# Patient Record
Sex: MALE | Race: WHITE | NOT HISPANIC OR LATINO | Employment: FULL TIME | URBAN - METROPOLITAN AREA
[De-identification: names, ages, dates, MRNs, and addresses within clinical notes are randomized per-mention and may not be internally consistent; named-entity substitution may affect disease eponyms.]

---

## 2017-11-13 ENCOUNTER — OFFICE VISIT - HEALTHEAST (OUTPATIENT)
Dept: FAMILY MEDICINE | Facility: CLINIC | Age: 37
End: 2017-11-13

## 2017-11-13 DIAGNOSIS — Z30.09 ENCOUNTER FOR VASECTOMY COUNSELING: ICD-10-CM

## 2017-11-13 DIAGNOSIS — M67.40 GANGLION CYST: ICD-10-CM

## 2017-11-13 DIAGNOSIS — E78.00 PURE HYPERCHOLESTEROLEMIA: ICD-10-CM

## 2017-11-13 DIAGNOSIS — Z00.00 ROUTINE GENERAL MEDICAL EXAMINATION AT A HEALTH CARE FACILITY: ICD-10-CM

## 2017-11-13 LAB
CHOLEST SERPL-MCNC: 145 MG/DL
FASTING STATUS PATIENT QL REPORTED: YES
HDLC SERPL-MCNC: 41 MG/DL
LDLC SERPL CALC-MCNC: 96 MG/DL
TRIGL SERPL-MCNC: 39 MG/DL

## 2017-11-13 ASSESSMENT — MIFFLIN-ST. JEOR: SCORE: 1915.3

## 2017-11-16 ENCOUNTER — OFFICE VISIT - HEALTHEAST (OUTPATIENT)
Dept: FAMILY MEDICINE | Facility: CLINIC | Age: 37
End: 2017-11-16

## 2017-11-16 DIAGNOSIS — Z30.09 ENCOUNTER FOR VASECTOMY COUNSELING: ICD-10-CM

## 2017-11-21 ENCOUNTER — RECORDS - HEALTHEAST (OUTPATIENT)
Dept: ADMINISTRATIVE | Facility: OTHER | Age: 37
End: 2017-11-21

## 2017-11-30 ENCOUNTER — COMMUNICATION - HEALTHEAST (OUTPATIENT)
Dept: FAMILY MEDICINE | Facility: CLINIC | Age: 37
End: 2017-11-30

## 2018-02-17 ENCOUNTER — RECORDS - HEALTHEAST (OUTPATIENT)
Dept: ADMINISTRATIVE | Facility: OTHER | Age: 38
End: 2018-02-17

## 2019-05-28 ENCOUNTER — COMMUNICATION - HEALTHEAST (OUTPATIENT)
Dept: SCHEDULING | Facility: CLINIC | Age: 39
End: 2019-05-28

## 2019-05-28 ENCOUNTER — OFFICE VISIT - HEALTHEAST (OUTPATIENT)
Dept: FAMILY MEDICINE | Facility: CLINIC | Age: 39
End: 2019-05-28

## 2019-05-28 DIAGNOSIS — T14.8XXA PUNCTURE WOUND: ICD-10-CM

## 2019-11-14 ENCOUNTER — COMMUNICATION - HEALTHEAST (OUTPATIENT)
Dept: FAMILY MEDICINE | Facility: CLINIC | Age: 39
End: 2019-11-14

## 2019-11-26 ENCOUNTER — OFFICE VISIT - HEALTHEAST (OUTPATIENT)
Dept: FAMILY MEDICINE | Facility: CLINIC | Age: 39
End: 2019-11-26

## 2019-11-26 DIAGNOSIS — Z13.220 LIPID SCREENING: ICD-10-CM

## 2019-11-26 DIAGNOSIS — Z28.21 INFLUENZA VACCINATION DECLINED: ICD-10-CM

## 2019-11-26 DIAGNOSIS — R10.13 ABDOMINAL PAIN, EPIGASTRIC: ICD-10-CM

## 2019-11-26 LAB
ALBUMIN SERPL-MCNC: 4 G/DL (ref 3.5–5)
ALP SERPL-CCNC: 93 U/L (ref 45–120)
ALT SERPL W P-5'-P-CCNC: 24 U/L (ref 0–45)
ANION GAP SERPL CALCULATED.3IONS-SCNC: 9 MMOL/L (ref 5–18)
AST SERPL W P-5'-P-CCNC: 19 U/L (ref 0–40)
BILIRUB SERPL-MCNC: 0.8 MG/DL (ref 0–1)
BUN SERPL-MCNC: 13 MG/DL (ref 8–22)
CALCIUM SERPL-MCNC: 9.1 MG/DL (ref 8.5–10.5)
CHLORIDE BLD-SCNC: 105 MMOL/L (ref 98–107)
CHOLEST SERPL-MCNC: 161 MG/DL
CO2 SERPL-SCNC: 26 MMOL/L (ref 22–31)
CREAT SERPL-MCNC: 1.22 MG/DL (ref 0.7–1.3)
ERYTHROCYTE [DISTWIDTH] IN BLOOD BY AUTOMATED COUNT: 11.4 % (ref 11–14.5)
FASTING STATUS PATIENT QL REPORTED: YES
GFR SERPL CREATININE-BSD FRML MDRD: >60 ML/MIN/1.73M2
GLUCOSE BLD-MCNC: 96 MG/DL (ref 70–125)
HCT VFR BLD AUTO: 46.6 % (ref 40–54)
HDLC SERPL-MCNC: 44 MG/DL
HGB BLD-MCNC: 15.9 G/DL (ref 14–18)
LDLC SERPL CALC-MCNC: 104 MG/DL
LIPASE SERPL-CCNC: 16 U/L (ref 0–52)
MCH RBC QN AUTO: 30.8 PG (ref 27–34)
MCHC RBC AUTO-ENTMCNC: 34 G/DL (ref 32–36)
MCV RBC AUTO: 91 FL (ref 80–100)
PLATELET # BLD AUTO: 194 THOU/UL (ref 140–440)
PMV BLD AUTO: 8.1 FL (ref 7–10)
POTASSIUM BLD-SCNC: 4.1 MMOL/L (ref 3.5–5)
PROT SERPL-MCNC: 7.2 G/DL (ref 6–8)
RBC # BLD AUTO: 5.15 MILL/UL (ref 4.4–6.2)
SODIUM SERPL-SCNC: 140 MMOL/L (ref 136–145)
TRIGL SERPL-MCNC: 66 MG/DL
WBC: 6.5 THOU/UL (ref 4–11)

## 2019-11-26 ASSESSMENT — MIFFLIN-ST. JEOR: SCORE: 1944.21

## 2019-12-09 ENCOUNTER — OFFICE VISIT - HEALTHEAST (OUTPATIENT)
Dept: FAMILY MEDICINE | Facility: CLINIC | Age: 39
End: 2019-12-09

## 2019-12-09 DIAGNOSIS — R10.13 ABDOMINAL PAIN, EPIGASTRIC: ICD-10-CM

## 2019-12-09 DIAGNOSIS — Z00.00 ROUTINE GENERAL MEDICAL EXAMINATION AT A HEALTH CARE FACILITY: ICD-10-CM

## 2019-12-09 ASSESSMENT — MIFFLIN-ST. JEOR: SCORE: 1948.97

## 2020-01-29 ENCOUNTER — COMMUNICATION - HEALTHEAST (OUTPATIENT)
Dept: FAMILY MEDICINE | Facility: CLINIC | Age: 40
End: 2020-01-29

## 2020-01-29 DIAGNOSIS — R10.13 ABDOMINAL PAIN, EPIGASTRIC: ICD-10-CM

## 2020-06-09 ENCOUNTER — COMMUNICATION - HEALTHEAST (OUTPATIENT)
Dept: FAMILY MEDICINE | Facility: CLINIC | Age: 40
End: 2020-06-09

## 2020-06-09 DIAGNOSIS — Z13.9 SCREENING FOR CONDITION: ICD-10-CM

## 2020-06-19 ENCOUNTER — COMMUNICATION - HEALTHEAST (OUTPATIENT)
Dept: FAMILY MEDICINE | Facility: CLINIC | Age: 40
End: 2020-06-19

## 2020-12-09 ENCOUNTER — AMBULATORY - HEALTHEAST (OUTPATIENT)
Dept: FAMILY MEDICINE | Facility: CLINIC | Age: 40
End: 2020-12-09

## 2020-12-09 DIAGNOSIS — Z30.8 SURVEILLANCE FOR BIRTH CONTROL, VASECTOMY: ICD-10-CM

## 2020-12-10 ENCOUNTER — COMMUNICATION - HEALTHEAST (OUTPATIENT)
Dept: FAMILY MEDICINE | Facility: CLINIC | Age: 40
End: 2020-12-10

## 2020-12-10 DIAGNOSIS — L60.8 CHANGE IN NAIL APPEARANCE: ICD-10-CM

## 2020-12-14 ENCOUNTER — OFFICE VISIT - HEALTHEAST (OUTPATIENT)
Dept: PODIATRY | Facility: CLINIC | Age: 40
End: 2020-12-14

## 2020-12-14 DIAGNOSIS — B35.1 NAIL FUNGUS: ICD-10-CM

## 2021-05-26 VITALS — HEART RATE: 84 BPM | RESPIRATION RATE: 16 BRPM | SYSTOLIC BLOOD PRESSURE: 122 MMHG | DIASTOLIC BLOOD PRESSURE: 70 MMHG

## 2021-05-29 NOTE — TELEPHONE ENCOUNTER
Pt wife calling  Stepped on a nail over the week end on Saturday and is painful, pt goes to the [facility]  No redness, warmth or drainage noted    Recommend appt eval today for td and assessment    Gerda Dunlap RN [facility] RN Triage      Reason for Disposition    [1] Last tetanus shot > 5 years ago AND [2] DIRTY cut or scrape    Protocols used: FOOT AND ANKLE INJURY-A-AH

## 2021-05-29 NOTE — PROGRESS NOTES
Assessment/Plan:    Erwin Soriano is a 38 y.o. male presenting for:    1. Puncture wound  This appears clean and noninfected today.  Given that this is 3 days out I think it is unlikely that this will become infected but we did discuss signs and symptoms in detail and he will let me know if any of this occurs.  Otherwise, we will get him up-to-date with his tetanus vaccination.  Is the nail was new and I do not necessarily have any overt concerns for tetanus.  - Tdap vaccine,  6yo or older,  IM        There are no discontinued medications.        Chief Complaint:  Chief Complaint   Patient presents with     Wound     Stepped on nail on Saturday with left foot.  Last Tdap 1/14/2011       Subjective:   Erwin Soriano is a pleasant 38-year-old gentleman presenting to the clinic today with concerns after stepping on a nail this weekend.  Patient was wearing shoes.  He states that the nails were new.  He is unsure of how far the nail went into his foot but was able to remove it without any difficulty.  He estimates a few millimeters potentially.  He is kept the area clean.  There was initially some bleeding but not very much.    The area was slightly sore immediately after the injury.  He has been able to move his toe without any difficulty.  Soreness has been fading.    His last tetanus shot was in 2011.    12 point review of systems completed and negative except for what has been described above    Social History     Tobacco Use   Smoking Status Former Smoker   Smokeless Tobacco Current User     Types: Chew       No current outpatient medications on file.         Objective:  Vitals:    05/28/19 1010   BP: 118/74   Pulse: 76   Weight: (!) 225 lb 9.6 oz (102.3 kg)       Body mass index is 32.84 kg/m .    Vital signs reviewed and stable  General: No acute distress  Psych: Appropriate affect  HEENT: moist mucous membranes  Extremities: warm and well perfused with no edema  Skin: warm and dry with no rash, very small closed  puncture wound just distal to the first MTP on the left pad of his toe.  No tenderness to palpation.         This note has been dictated and transcribed using voice recognition software.   Any errors in transcription are unintentional and inherent to the software.

## 2021-05-31 VITALS — WEIGHT: 220 LBS | BODY MASS INDEX: 32.02 KG/M2

## 2021-05-31 VITALS — WEIGHT: 222 LBS | HEIGHT: 70 IN | BODY MASS INDEX: 31.78 KG/M2

## 2021-06-01 ENCOUNTER — RECORDS - HEALTHEAST (OUTPATIENT)
Dept: ADMINISTRATIVE | Facility: CLINIC | Age: 41
End: 2021-06-01

## 2021-06-02 ENCOUNTER — OFFICE VISIT - HEALTHEAST (OUTPATIENT)
Dept: FAMILY MEDICINE | Facility: CLINIC | Age: 41
End: 2021-06-02

## 2021-06-02 VITALS — WEIGHT: 225.6 LBS | BODY MASS INDEX: 32.84 KG/M2

## 2021-06-02 DIAGNOSIS — K21.9 GASTROESOPHAGEAL REFLUX DISEASE WITHOUT ESOPHAGITIS: ICD-10-CM

## 2021-06-02 DIAGNOSIS — Z30.2 ENCOUNTER FOR VASECTOMY: ICD-10-CM

## 2021-06-02 DIAGNOSIS — Z83.719 FAMILY HISTORY OF COLONIC POLYPS: ICD-10-CM

## 2021-06-02 DIAGNOSIS — Z00.00 ROUTINE GENERAL MEDICAL EXAMINATION AT A HEALTH CARE FACILITY: ICD-10-CM

## 2021-06-02 LAB
ANION GAP SERPL CALCULATED.3IONS-SCNC: 10 MMOL/L (ref 5–18)
BUN SERPL-MCNC: 16 MG/DL (ref 8–22)
CALCIUM SERPL-MCNC: 8.8 MG/DL (ref 8.5–10.5)
CHLORIDE BLD-SCNC: 107 MMOL/L (ref 98–107)
CHOLEST SERPL-MCNC: 161 MG/DL
CO2 SERPL-SCNC: 25 MMOL/L (ref 22–31)
CREAT SERPL-MCNC: 1.09 MG/DL (ref 0.7–1.3)
ERYTHROCYTE [DISTWIDTH] IN BLOOD BY AUTOMATED COUNT: 13.2 % (ref 11–14.5)
FASTING STATUS PATIENT QL REPORTED: YES
GFR SERPL CREATININE-BSD FRML MDRD: >60 ML/MIN/1.73M2
GLUCOSE BLD-MCNC: 102 MG/DL (ref 70–125)
HCT VFR BLD AUTO: 45.3 % (ref 40–54)
HDLC SERPL-MCNC: 44 MG/DL
HGB BLD-MCNC: 15.2 G/DL (ref 14–18)
LDLC SERPL CALC-MCNC: 108 MG/DL
MCH RBC QN AUTO: 29.7 PG (ref 27–34)
MCHC RBC AUTO-ENTMCNC: 33.6 G/DL (ref 32–36)
MCV RBC AUTO: 89 FL (ref 80–100)
PLATELET # BLD AUTO: 185 THOU/UL (ref 140–440)
PMV BLD AUTO: 9.9 FL (ref 7–10)
POTASSIUM BLD-SCNC: 4.1 MMOL/L (ref 3.5–5)
RBC # BLD AUTO: 5.12 MILL/UL (ref 4.4–6.2)
SODIUM SERPL-SCNC: 142 MMOL/L (ref 136–145)
TRIGL SERPL-MCNC: 47 MG/DL
WBC: 6.7 THOU/UL (ref 4–11)

## 2021-06-02 RX ORDER — FAMOTIDINE 40 MG/1
40 TABLET, FILM COATED ORAL EVERY EVENING
Qty: 90 TABLET | Refills: 3 | Status: SHIPPED | OUTPATIENT
Start: 2021-06-02 | End: 2022-03-30

## 2021-06-02 ASSESSMENT — MIFFLIN-ST. JEOR: SCORE: 1968.94

## 2021-06-03 VITALS
WEIGHT: 230.3 LBS | SYSTOLIC BLOOD PRESSURE: 126 MMHG | BODY MASS INDEX: 34.11 KG/M2 | RESPIRATION RATE: 12 BRPM | HEART RATE: 68 BPM | HEIGHT: 69 IN | DIASTOLIC BLOOD PRESSURE: 84 MMHG

## 2021-06-03 VITALS
RESPIRATION RATE: 20 BRPM | WEIGHT: 228.38 LBS | TEMPERATURE: 97.9 F | BODY MASS INDEX: 32.69 KG/M2 | DIASTOLIC BLOOD PRESSURE: 80 MMHG | HEIGHT: 70 IN | SYSTOLIC BLOOD PRESSURE: 120 MMHG | HEART RATE: 84 BPM

## 2021-06-03 NOTE — PROGRESS NOTES
Normal CBC, CMP, lipid Cascade, and lipase.  Patient updated by my chart message.  Sharita Montoya, DO

## 2021-06-03 NOTE — PROGRESS NOTES
[facility] Note    Name: Erwin Soriano  : 1980   MRN: [id number]    Erwin Soriano is a 39 y.o. male presenting to discuss the following:     CC:   Chief Complaint   Patient presents with     Abdominal Pain       HPI:  Epigastric abdominal pain, painful throughout the day, worse when sitting down at night with more pressure in the area. Symptoms have been present for a month or so now. Symptoms are stable, not worsening. Doesn't correlate with certain foods. Didn't identify a trigger. Worsens at the end of the day, may be paying more attention? Pain doesn't radiate anywhere.    Tried Prilosec pack (approximately 2 weeks), helped a little bit.     ROS:   CONSTITUTIONAL: No fevers or chills, no weight changes, no night sweats.   HEART: Feels more short of breath with activity. No tachycardia, no lightheadedness, no dizziness, no presyncope.   LUNGS: Coughing more at night. History of smoking, has quit.   ABDOMEN: No nausea, no vomiting, no diarrhea. History of acid reflux. No constipation, no blood in stools.   : No urinary symptoms.    PMH:   Patient Active Problem List   Diagnosis     Hypercholesterolemia     Overweight       No past medical history on file.    PSH:   No past surgical history on file.      MEDICATIONS:   No current outpatient medications on file prior to visit.     No current facility-administered medications on file prior to visit.        ALLERGIES:  No Known Allergies    FAMHx:  Family History   Problem Relation Age of Onset     COPD Father      No Medical Problems Sister      No Medical Problems Brother      Cancer Paternal Grandfather    No family history of heart disease.    SOCIAL HISTORY:   Social History     Tobacco Use     Smoking status: Former Smoker     Smokeless tobacco: Former User     Types: Chew   Substance Use Topics     Alcohol use: Not on file     Drug use: Not on file       PHYSICAL EXAM:   /80   Pulse 84   Temp 97.9  F (36.6  C) (Oral)   Resp 20    "Ht 5' 9.5\" (1.765 m)   Wt (!) 228 lb 6 oz (103.6 kg)   BMI 33.24 kg/m     GENERAL: Erwin is a pleasant, well appearing male, obese, in no acute distress.   HEENT: Sclera white, no nasal discharge.   HEART: Regular rate and rhythm, no murmurs.   LUNGS: Clear to auscultation bilaterally, unlabored.   ABDOMEN: Soft.  Tender to palpation in epigastric region, no guarding, no rigidity, no rebound tenderness.  No palpable masses.    ASSESSMENT & PLAN:   Erwin Soriano is a 39 y.o. male presenting today for evaluation of 4-6 weeks of epigastric abdominal pain, slightly improved with 2 week trial of Prilosec.     1. Abdominal pain, epigastric  - HM2(CBC w/o Differential)  - Comprehensive Metabolic Panel  - Lipase  - omeprazole (PRILOSEC) 20 MG capsule; Take 1 capsule (20 mg total) by mouth daily.  Dispense: 60 capsule; Refill: 0    Epigastric abdominal symptoms, concerning for underlying gastritis.  Discussed differential diagnosis of angina versus gallbladder disease versus liver disease versus pancreatitis versus constipation.  No red flag symptoms present.  No obvious triggers.  Do not suspect underlying cardiac etiology given symptoms have been persistent for the last 4 to 6 weeks, no exertional chest pain, and low risk patient. Symptoms slightly improved with trial of PPI.  Discussed option of 8-week treatment with omeprazole to treat underlying stomach acid problem.  If symptoms worsen despite omeprazole, or recur after discontinuation of omeprazole, recommend proceeding to EGD.  Discussed option of labs to help rule out other underlying etiologies and patient is interested in laboratory today.  CBC, CMP, and lipase obtained.  We will be in touch by my chart with lab results.    2. Lipid screening  - Lipid Cascade FASTING    3. Influenza vaccination declined    HM: Declines flu shot today     RTC: 2 months - follow up epigastric abdominal pain / sooner if worsening  1 year - annual physical exam    Sharita Montoya, DO   "

## 2021-06-04 NOTE — PATIENT INSTRUCTIONS - HE
Continue omeprazole for the next 2-3 weeks then you can wean that.   Try to limit spicy foods, toamto based products and ibuprofen  If having ongoing pain I will recommend an upper endoscopy  Limit carbohydrates and work on weight loss  You can consider a colonoscopy next year

## 2021-06-04 NOTE — PROGRESS NOTES
Assessment/ Plan     1. Routine general medical examination at a health care facility    Recommend he continue work on diet and weight loss  Patient declines the influenza vaccine today    Given a family history of colon cancer in his grandmother and colon polyps in his father you have a colonoscopy by age 40    His recent cholesterol numbers were excellent  His blood sugar test was normal    2. Abdominal pain, epigastric  May be secondary to dyspepsia.  He has a history of esophageal reflux symptoms    He has shown improvement while taking omeprazole  Recommend limiting spicy foods, tomato based products, and NSAID intake  His kidney and liver profile were within normal limits  Discussed that if he has ongoing discomfort would consider an abdominal ultrasound to rule out gallbladder etiology.  Consider also an upper endoscopy  Patient will continue Meprazole over the next 2-3 weeks and then consider weaning that medication  He will keep a diary and follow-up as needed      Subjective:       Erwin Soriano is a 39 y.o. male who presents to the clinic for complete physical examination.  Recent medical history is notable for an evaluation for epigastric abdominal pain which he has had over the past 6 weeks or so.  He describes a constant, aching discomfort.  This was worse at night.  He was started on omeprazole and there has been some improvement.  In the past he has had reflux symptoms.  His comprehensive metabolic panel revealed normal kidney and liver function.  He denies dark tarry stools or passage of bright red blood per rectum.    Since his last physical examination he has followed up with orthopedics for treatment of a flexor tendon sheath of the left middle finger.  That has resolved.    He states that he can get mildly short of breath with exertion but admits he is not getting regular aerobic exercise.  He is overweight.    Family history is reviewed.  His grandmother had colon cancer and his father has had  "colon polyps.  He agrees to consider a colonoscopy this next year.    His recent total cholesterol is 161 with an LDL of 104.    Review of systems otherwise negative.  In the past he contemplated vasectomy but now is planning on having one more child.    He denies chest pain with exertion, depression symptoms, or mood concerns.  He has been sober for 14 years.  He previously drank alcohol.    The following portions of the patient's history were reviewed and updated as appropriate: allergies, current medications, past family history, past medical history, past social history, past surgical history and problem list. Medications have been reconciled    Review of Systems   A 12 point comprehensive review of systems was negative except as noted.      Current Outpatient Medications   Medication Sig Dispense Refill     omeprazole (PRILOSEC) 20 MG capsule Take 1 capsule (20 mg total) by mouth daily. 60 capsule 0     No current facility-administered medications for this visit.        Objective:      /84   Pulse 68   Resp 12   Ht 5' 9.25\" (1.759 m)   Wt (!) 230 lb 4.8 oz (104.5 kg)   BMI 33.76 kg/m        General appearance: alert, appears stated age and cooperative  Head: Normocephalic, without obvious abnormality, atraumatic  Eyes: conjunctivae/corneas clear. PERRL, EOM's intact.   Ears: normal TM's and external ear canals both ears  Nose: Nares normal. Septum midline. Mucosa normal. No drainage or sinus tenderness.  Throat: lips, mucosa, and tongue normal; teeth and gums normal  Neck: no adenopathy, supple, symmetrical, trachea midline and thyroid not enlarged  Back: symmetric, no curvature. ROM normal. No CVA tenderness.  Lungs: clear to auscultation bilaterally  Heart: regular rate and rhythm, S1, S2 normal, no murmur, click, rub or gallop  Abdomen: soft, non-tender; bowel sounds normal; no masses,  no organomegaly  Genitourinary: Penis circumcised, there are no scrotal masses, no inguinal hernia  Extremities: " extremities normal, atraumatic, no cyanosis or edema  Skin: Skin color, texture, turgor normal.  There is a large tattoo on the right side of his abdomen and upper back  Lymph nodes: Cervical nodes normal.  Neurologic: Alert and oriented X 3         No results found for this or any previous visit (from the past 168 hour(s)).       This note has been dictated using voice recognition software. Any grammatical or context distortions are unintentional and inherent to the software

## 2021-06-05 NOTE — TELEPHONE ENCOUNTER
Refill Approved    Rx renewed per Medication Renewal Policy. Medication was last renewed on 11/26/19.    Denise Frank, [facility] Triage/Med Refill 1/29/2020     Requested Prescriptions   Pending Prescriptions Disp Refills     omeprazole (PRILOSEC) 20 MG capsule [Pharmacy Med Name: OMEPRAZOLE DR 20 MG CAPSULE] 60 capsule 0     Sig: TAKE 1 CAPSULE BY MOUTH EVERY DAY       GI Medications Refill Protocol Passed - 1/29/2020 10:40 AM        Passed - PCP or prescribing provider visit in last 12 or next 3 months.     Last office visit with prescriber/PCP: 11/26/2019 Sharita Montoya DO OR same dept: 11/26/2019 Sharita Montoya DO OR same specialty: 11/26/2019 Sharita Montoya DO  Last physical: Visit date not found Last MTM visit: Visit date not found   Next visit within 3 mo: Visit date not found  Next physical within 3 mo: Visit date not found  Prescriber OR PCP: Sharita Montoya DO  Last diagnosis associated with med order: 1. Abdominal pain, epigastric  - omeprazole (PRILOSEC) 20 MG capsule [Pharmacy Med Name: OMEPRAZOLE DR 20 MG CAPSULE]; TAKE 1 CAPSULE BY MOUTH EVERY DAY  Dispense: 60 capsule; Refill: 0    If protocol passes may refill for 12 months if within 3 months of last provider visit (or a total of 15 months).

## 2021-06-08 NOTE — TELEPHONE ENCOUNTER
Dr. Hester-  Is pt able to get labs done at this time to check his blood type? Please place order if appropriate.

## 2021-06-13 NOTE — TELEPHONE ENCOUNTER
Referral pended. Looks like you havent seen him since last Dec. Do you need to see him for a visit?

## 2021-06-13 NOTE — PROGRESS NOTES
FOOT AND ANKLE SURGERY/PODIATRY CONSULT NOTE         ASSESSMENT:   Onychomycosis third and fourth toe left foot      TREATMENT:  I have recommended ciclopirox to be applied as directed.  I informed the patient that this medication may or may not be effective in treating his fungal infection.  He was told that he would have to use this medication for several months.  He is to return to the clinic as needed.        HPI: I was asked to see Erwin Soriano today to evaluate and treat discoloration of the toenails on the left foot.  The patient stated that over the past year he noticed that his third and fourth toenails began to change color.  They are also becoming a bit thick and slightly brittle.  He has not had any trauma to the toenails.  He has not had any redness or swelling surrounding this particular nails.  He denies any other previous treatment.  He is able to wear shoes without any discomfort.  The patient was seen in consultation at the request of Antoine Ford MD for evaluation and treatment of fungal toenails left foot.     No past medical history on file.    No past surgical history on file.    Allergies   Allergen Reactions     Amoxicillin Rash     At childhood           Current Outpatient Medications:      omeprazole (PRILOSEC) 20 MG capsule, TAKE 1 CAPSULE BY MOUTH EVERY DAY, Disp: 90 capsule, Rfl: 3    Family History   Problem Relation Age of Onset     COPD Father      No Medical Problems Sister      No Medical Problems Brother      Colon cancer Paternal Grandmother      Cancer Paternal Grandfather        Social History     Socioeconomic History     Marital status:      Spouse name: Not on file     Number of children: Not on file     Years of education: Not on file     Highest education level: Not on file   Occupational History     Not on file   Social Needs     Financial resource strain: Not on file     Food insecurity     Worry: Not on file     Inability: Not on file     Transportation needs      Medical: Not on file     Non-medical: Not on file   Tobacco Use     Smoking status: Former Smoker     Smokeless tobacco: Former User     Types: Chew   Substance and Sexual Activity     Alcohol use: Not on file     Drug use: Not on file     Sexual activity: Not on file   Lifestyle     Physical activity     Days per week: Not on file     Minutes per session: Not on file     Stress: Not on file   Relationships     Social connections     Talks on phone: Not on file     Gets together: Not on file     Attends Rastafari service: Not on file     Active member of club or organization: Not on file     Attends meetings of clubs or organizations: Not on file     Relationship status: Not on file     Intimate partner violence     Fear of current or ex partner: Not on file     Emotionally abused: Not on file     Physically abused: Not on file     Forced sexual activity: Not on file   Other Topics Concern     Not on file   Social History Narrative     Not on file       Review of Systems - Patient denies fever, chills, rash, wound, stiffness, limping, numbness, weakness, heart burn, blood in stool, chest pain with activity, calf pain when walking, shortness of breath with activity, chronic cough, easy bleeding/bruising, swelling of ankles, excessive thirst, fatigue, depression, anxiety.  Patient admits to fungus toenails left foot.      OBJECTIVE:  Appearance: alert, well appearing, and in no distress.    Vitals:    12/14/20 1509   BP: 122/70   Pulse: 84   Resp: 16       BMI= There is no height or weight on file to calculate BMI.    General appearance: Patient is alert and fully cooperative with history & exam.  No sign of distress is noted during the visit.  Psychiatric: Affect is pleasant & appropriate.  Patient appears motivated to improve health.  Respiratory: Breathing is regular & unlabored while sitting.  HEENT: Hearing is intact to spoken word.  Speech is clear.  No gross evidence of visual impairment that would impact  ambulation.    Vascular: Dorsalis pedis and posterior tibial pulses are palpable. There is pedal hair growth bilaterally.  CFT < 3 sec from anterior tibial surface to distal digits bilaterally. There is no appreciable edema noted.  Dermatologic: The third and fourth toenails left foot are severely discolored.  The third toenail is slightly thickened.  Turgor and texture are within normal limits. No coloration or temperature changes. No primary or secondary lesions noted.  Neurologic: All epicritic and proprioceptive sensations are grossly intact bilaterally.  Musculoskeletal: All active and passive ankle, subtalar, midtarsal, and 1st MPJ range of motion are grossly intact without pain or crepitus, with the exception of none. Manual muscle strength is within normal limits bilaterally. All dorsiflexors, plantarflexors, invertors, evertors are intact bilaterally.  No tenderness present to the third and fourth toes left foot on palpation.  No tenderness to the third and fourth toes left foot with range of motion. Calf is soft/non-tender without warmth/induration    Imaging:         No results found.         Tu Wesley; LEVI  [facility] Foot & Ankle Surgery/Podiatry

## 2021-06-14 NOTE — PROGRESS NOTES
Assessment/ Plan     1. Routine general medical examination at a health care facility    The following high BMI interventions were performed this visit: encouragement to exercise and weight monitoring   Check a lipid cascade and glucose  - Glucose  Recommend regular self testicular examination    2. Hypercholesterolemia    Recommend he continue to work on diet and exercise  Check a lipid cascade  - Lipid Cascade    3. Encounter for vasectomy counseling    Refer to Dr.Andrew Moon for consultation about a vasectomy    4. Ganglion cyst, left third finger    Refer to orthopedics to discuss removal    - Ambulatory referral to Orthopedics      Subjective:       Erwin Soriano is a 37 y.o. male who presents for a complete physical examination.  His medical history is generally unremarkable for chronic health conditions.  He is not taking any medications currently and denies any allergies.  Social history is notable for the fact that he is  and has 3 children.  He is self-employed in own some automotive shops.    He has two primary concerns.  First, he has developed small lump involving the left third finger.  This can cause some discomfort at times.  Next, he would like to have a vasectomy.  In the past his total cholesterol was 149 with an LDL of 94.  Review of systems is negative for headache, dizziness, chest pain, shortness breath, palpitations, or bowel concerns.  He generally is doing well.    The following portions of the patient's history were reviewed and updated as appropriate: allergies, current medications, past family history, past medical history, past social history, past surgical history and problem list.    Review of Systems   A 12 point comprehensive review of systems was negative except as noted.      No current outpatient prescriptions on file.     No current facility-administered medications for this visit.        Objective:      /68  Pulse 72  Temp 98  F (36.7  C) (Oral)   Resp 20   "Ht 5' 9.5\" (1.765 m)  Wt 222 lb (100.7 kg)  BMI 32.31 kg/m2      General appearance: alert, appears stated age and cooperative  Head: Normocephalic, without obvious abnormality, atraumatic  Eyes: conjunctivae/corneas clear. PERRL, EOM's intact.   Ears: normal TM's and external ear canals both ears  Nose: Nares normal. Septum midline. Mucosa normal. No drainage or sinus tenderness.  Throat: lips, mucosa, and tongue normal; teeth and gums normal  Neck: no adenopathy, supple, symmetrical, trachea midline and thyroid not enlarged, symmetric, no tenderness/mass/nodules  Back: symmetric, no curvature. ROM normal. No CVA tenderness.  Lungs: clear to auscultation bilaterally  Heart: regular rate and rhythm, S1, S2 normal, no murmur, click, rub or gallop  Abdomen: soft, non-tender; bowel sounds normal; no masses,  no organomegaly  Genitourinary: Penis is circumcised, no scrotal masses, no inguinal hernia  Extremities: extremities normal, atraumatic, no cyanosis or edema  There is a small palpable lump involving the left third finger, palmar aspect  Skin: Skin color, texture, turgor normal. No rashes or lesions  Lymph nodes: Cervical nodes normal.  Neurologic: Alert and oriented X 3. Normal coordination and gait         Recent Results (from the past 168 hour(s))   Lipid Cascade   Result Value Ref Range    Cholesterol 145 <=199 mg/dL    Triglycerides 39 <=149 mg/dL    HDL Cholesterol 41 >=40 mg/dL    LDL Calculated 96 <=129 mg/dL    Patient Fasting > 8hrs? Yes    Glucose   Result Value Ref Range    Glucose 114 (H) 70 - 99 mg/dL    Patient Fasting > 8hrs? Yes           This note has been dictated using voice recognition software. Any grammatical or context distortions are unintentional and inherent to the software  "

## 2021-06-14 NOTE — PROGRESS NOTES
Assessment:    1. Encounter for vasectomy counseling           Plan:    Patient seen today for contraception counseling.  Desires permanent sterilization.  Pre-vasectomy literature was provided.  Risks benefits and alternatives were discussed.  Patient elects to continue with vasectomy with consent form reviewed and signed by patient.  Patient was scheduled for vasectomy at his convenience likely December 15, 2017.        Subjective:    Erwin Soriano is seen today for consultation regarding vasectomy.  Desires permanent sterilization.  Past medical social and family history reviewed and updated as noted below.  Denies history of inguinal hernia.  No history of epididymitis etc.  No chronic health concerns identified.  Comprehensive review of systems as above otherwise all negative.     - Diane x 2009  3 children (1 son with prior relationshiop, 2 daughters with current wife)  Surgeries: none  Hospitalizations: none  Mom -   Dad -   1 bro -   1 sis -   No smoke  EtOH: none (prior issues)  Owns automobile shops  Works with [employer]    History reviewed. No pertinent surgical history.     Family History   Problem Relation Age of Onset     COPD Father      No Medical Problems Sister      No Medical Problems Brother      Cancer Paternal Grandfather         History reviewed. No pertinent past medical history.     Social History   Substance Use Topics     Smoking status: Former Smoker     Smokeless tobacco: Current User     Types: Chew     Alcohol use None        No current outpatient prescriptions on file.     No current facility-administered medications for this visit.           Objective:    Vitals:    11/16/17 1430   BP: 110/60   Pulse: 72   Weight: 220 lb (99.8 kg)      Body mass index is 32.02 kg/(m^2).    Alert.  No apparent distress.  Chest clear.  Cardiac exam regular.  Genitalia circumcised male with testes descended bilaterally.  No hydrocele.  Left sided varicocele.  Palpable vas deferens.  No inguinal  hernia.  Extremities warm and dry.

## 2021-06-16 PROBLEM — K21.9 GASTROESOPHAGEAL REFLUX DISEASE WITHOUT ESOPHAGITIS: Status: ACTIVE | Noted: 2021-06-02

## 2021-06-26 ENCOUNTER — HEALTH MAINTENANCE LETTER (OUTPATIENT)
Age: 41
End: 2021-06-26

## 2021-06-26 NOTE — PATIENT INSTRUCTIONS - HE
Remain physically active.  Your goal is 30 minutes aerobic exercise most days  I sent famotidine/Pepcid 40 mg a day to your pharmacy  Please me know if that is not working.  We can consider sending medication that is stronger  Set up the colonoscopy  Follow-up with urology to discuss a vasectomy

## 2021-07-04 NOTE — PROGRESS NOTES
Progress Notes by Antoine Hester MD at 6/2/2021  7:00 AM     Author: Antoine Hester MD Service: -- Author Type: Physician    Filed: 6/17/2021  5:48 PM Encounter Date: 6/2/2021 Status: Signed    : Antoine Hester MD (Physician)       MALE PREVENTATIVE EXAM    Assessment and Plan:     Patient has been advised of split billing requirements and indicates understanding: Yes    1. Routine general medical examination at a health care facility    Recommend remaining physically active.  His goal is 30 minutes of aerobic exercise most days    His second Covid vaccine is due today    2. Gastroesophageal reflux disease without esophagitis    He did not tolerate omeprazole in the past  Recommend a trial of famotidine/Pepcid 40 mg daily  If not effective then consider a trial of Protonix or Prevacid  Discussed that if he has ongoing breakthrough symptoms can consider an upper endoscopy  - famotidine (PEPCID) 40 MG tablet; Take 1 tablet (40 mg total) by mouth every evening.  Dispense: 90 tablet; Refill: 3    3. Family history of colonic polyps    Refer for a colonoscopy given a family history of colon polyps in multiple relatives  - Ambulatory referral for Colonoscopy    4. Encounter for vasectomy    Refer to urology  - Ambulatory referral to Urology        Next follow up:  No follow-ups on file.    Immunization Review  Adult Imm Review: No immunizations due today  BMI: Reviewed plan  Documented tobacco use.  Website and phone contact for [employer] given to patient in AVS. and No tobacco use    I discussed the following with the patient:   Adult Healthy Living: Importance of regular exercise  Healthy nutrition    I have had an Advance Directives discussion with the patient.    Subjective:   Chief Complaint: Erwin Soriano is an 40 y.o. male here for a preventative health visit.    Patient has been advised of split billing requirements and indicates understanding: Yes  HPI: This is a pleasant  40-year-old male who presents to the clinic for complete physical examination.      His medical history is notable for history of reflux symptoms.  In the past he had an evaluation for abdominal pain and was started on omeprazole at the time.  There was significant improvement and he has not taken that on a consistent basis.  He did not tolerate omeprazole very well.  However, he does have intermittent reflux symptoms.    His last total cholesterol is 161 with an LDL of 104.    He notes that there is a family history of colon polyps and he would like to consider a colonoscopy as multiple relatives have been affected.    Additionally, he would like to follow-up with urology for a vasectomy.      .Healthy Habits  Are you taking a daily aspirin? No  Do you typically exercising at least 40 min, 3-4 times per week?  NO  Do you usually eat at least 4 servings of fruit and vegetables a day, include whole grains and fiber and avoid regularly eating high fat foods? NO  Have you had an eye exam in the past two years? NO  Do you see a dentist twice per year? Yes  Do you have any concerns regarding sleep? No    Safety Screen  If you own firearms, are they secured in a locked gun cabinet or with trigger locks? NO  Do you feel you are safe where you are living?: Yes (6/2/2021  7:17 AM)  Do you feel you are safe in your relationship(s)?: Yes (6/2/2021  7:17 AM)      Review of Systems:  Please see above.  The rest of the review of systems are negative for all systems.     Cancer Screening     Patient has no health maintenance due at this time          Patient Care Team:  Antoine Hester MD as PCP - General  Antoine Hester MD as Assigned PCP  Tu Wesley DPM as Assigned Musculoskeletal Provider        History     Reviewed By Date/Time Sections Reviewed    Alexandra Ya CMA 6/2/2021  7:15 AM Tobacco            Objective:   Vital Signs:   Visit Vitals  /81 (Patient Site: Left Arm, Patient Position:  "Sitting, Cuff Size: Adult Regular)   Pulse (!) 59   Temp (!) 96.5  F (35.8  C) (Oral)   Resp 16   Ht 5' 10.08\" (1.78 m)   Wt (!) 231 lb 12.8 oz (105.1 kg)   BMI 33.18 kg/m           PHYSICAL EXAM  General appearance: alert, appears stated age and cooperative  Head: Normocephalic, without obvious abnormality, atraumatic  Eyes: conjunctivae/corneas clear. PERRL, EOM's intact.   Ears: normal TM's and external ear canals both ears  Nose: Nares normal. Septum midline. Mucosa normal.  Throat: lips, mucosa, and tongue normal; teeth and gums normal  Neck: no adenopathy, supple, symmetrical, trachea midline   Back: symmetric, no curvature. ROM normal.  Lungs: clear to auscultation bilaterally  Heart: regular rate and rhythm, S1, S2 normal, no murmur, click, rub or gallop  Abdomen: soft, non-tender  Extremities: extremities normal, atraumatic, no cyanosis or edema  Skin: Skin color, texture, turgor normal.  Lymph nodes: Cervical nodes normal.  Neurologic: Alert and oriented X 3.        The 10-year ASCVD risk score (Jj DC Jr., et al., 2013) is: 0.7%    Values used to calculate the score:      Age: 40 years      Sex: Male      Is Non- : No      Diabetic: No      Tobacco smoker: No      Systolic Blood Pressure: 117 mmHg      Is BP treated: No      HDL Cholesterol: 44 mg/dL      Total Cholesterol: 161 mg/dL         Medication List          Accurate as of June 2, 2021 11:59 PM. If you have any questions, ask your nurse or doctor.            START taking these medications    famotidine 40 MG tablet  Also known as: PEPCID  INSTRUCTIONS: Take 1 tablet (40 mg total) by mouth every evening.  Started by: Antoine Hester MD           STOP taking these medications    omeprazole 20 MG capsule  Also known as: PriLOSEC  Stopped by: Antoine Hester MD           Where to Get Your Medications      These medications were sent to [facility]/pharmacy #[id number] - [place], [place] - [address] [facility] [address]  [address] [place] [address], [place] " [facility] [address]    Phone: [phone]     famotidine 40 MG tablet         Additional Screenings Completed Today:

## 2021-07-06 VITALS
HEIGHT: 70 IN | RESPIRATION RATE: 16 BRPM | SYSTOLIC BLOOD PRESSURE: 117 MMHG | HEART RATE: 59 BPM | BODY MASS INDEX: 33.18 KG/M2 | TEMPERATURE: 96.5 F | WEIGHT: 231.8 LBS | DIASTOLIC BLOOD PRESSURE: 81 MMHG

## 2021-08-17 ENCOUNTER — TRANSFERRED RECORDS (OUTPATIENT)
Dept: HEALTH INFORMATION MANAGEMENT | Facility: CLINIC | Age: 41
End: 2021-08-17

## 2021-09-10 ENCOUNTER — APPOINTMENT (OUTPATIENT)
Dept: FAMILY MEDICINE | Facility: CLINIC | Age: 41
End: 2021-09-10
Payer: COMMERCIAL

## 2021-09-10 ENCOUNTER — OFFICE VISIT (OUTPATIENT)
Dept: FAMILY MEDICINE | Facility: CLINIC | Age: 41
End: 2021-09-10
Payer: COMMERCIAL

## 2021-09-10 VITALS
BODY MASS INDEX: 33.93 KG/M2 | DIASTOLIC BLOOD PRESSURE: 60 MMHG | OXYGEN SATURATION: 97 % | WEIGHT: 237 LBS | SYSTOLIC BLOOD PRESSURE: 120 MMHG | HEART RATE: 96 BPM

## 2021-09-10 DIAGNOSIS — Z30.2 ENCOUNTER FOR VASECTOMY: Primary | ICD-10-CM

## 2021-09-10 PROCEDURE — 55250 REMOVAL OF SPERM DUCT(S): CPT | Performed by: FAMILY MEDICINE

## 2021-09-10 PROCEDURE — 88302 TISSUE EXAM BY PATHOLOGIST: CPT | Performed by: PATHOLOGY

## 2021-09-10 NOTE — PROGRESS NOTES
Vasectomy Procedure Note    Indications: 40 year old male desiring permanent sterilization    Pre-operative Diagnosis: Undesired fertility    Post-operative Diagnosis: Undesired fertility    Anesthesia: 1% lidocaine, 3 ml    Procedure Details:    Erwin Soriano  is seen today for scheduled vasectomy.  Patient desires permanent sterilization.  Consent form previously reviewed and signed by patient.   Consent form reviewed prior to procedure with physician statement signed.   Patient elects to continue with scheduled procedure.     - Diane x 2009   3 children (1 son (age 20) with prior relationshiop, 2 daughters (9 and 5) with current wife)   Surgeries:  none   Hospitalizations:  none   Mom -   Dad -   1 bro -   1 sis -   No smoke   EtOH:  none (prior issues)   Owns automobile shops    Erwin Soriano was prepped and draped in usual sterile fashion.  Right vas deferens isolated subcutaneously.  1% lidocaine injected superficially then to vas deferens.  15 blade incision performed.  Curved hemostat for blunt dissection.  Towel clip for vas deferens isolation.  Vas deferens sheath removed exposing normal-appearing vas deferens.  4-O chromic suture both proximal and distal segment of vas deferens with central portion excised for pathology.  Electrocautery of vas deferens ends performed.  Distal end then replacing into fascia with 5-0 chromic suture.  Good hemostasis noted.  Vas deferens then replacing into scrotum.  Single 4-0 chromic suture for skin incision closure.    Left vas deferens then isolated in a similar fashion.  1% lidocaine injected superficially and then to level of vas deferens and surrounding tissue.  15 blade incision performed.  Curved hemostat for blunt dissection.  Towel clip for vas deferens isolation.  Vas deferens sheath removed exposing normal-appearing vas deferens.  4-0 chromic suture both proximal and distal segment of vas deferens with central portion excised for pathology.   Writer called patient with no answer times once to set up an emg appointment with Dr Robyn Adrian per order. Electrocautery of vas deferens ends performed.  Distal and replaced in the fascia with 5-0 chromic suture.  Good hemostasis noted.  Vas deferens then replaced into scrotum.  Single 4-0 chromic for skin incision closure.  Triple antibiotic with 4 x 4 gauze pads placed at bilateral incision sites.      Specimen: vas segment, bilateral    Condition: Stable    Complications: none    Plan:  1. Continue contraception until negative sperm analysis. Semen count after 20-25 ejaculations and 12 weeks s/p vasectomy.  2. Warning signs of infection were reviewed.   3. Written home care instructions provided.  Backup contraception until confirmed sterility.  May resume normal bathing after 24 hours.  Avoid strenuous activity times one week.  Ibuprofen or Tylenol OTC for pain management.  Notify with increased swelling, bleeding, or drainage.  Postvasectomy semen analysis in 12 weeks after 20-25 ejaculations to confirm desired sterility.  Call the clinic if excessive pain, bleeding or swelling.    Awake

## 2021-09-14 LAB
PATH REPORT.COMMENTS IMP SPEC: NORMAL
PATH REPORT.COMMENTS IMP SPEC: NORMAL
PATH REPORT.FINAL DX SPEC: NORMAL
PATH REPORT.GROSS SPEC: NORMAL
PATH REPORT.MICROSCOPIC SPEC OTHER STN: NORMAL
PATH REPORT.RELEVANT HX SPEC: NORMAL
PHOTO IMAGE: NORMAL

## 2021-10-16 ENCOUNTER — HEALTH MAINTENANCE LETTER (OUTPATIENT)
Age: 41
End: 2021-10-16

## 2021-12-17 ENCOUNTER — LAB (OUTPATIENT)
Dept: LAB | Facility: CLINIC | Age: 41
End: 2021-12-17
Payer: COMMERCIAL

## 2021-12-17 DIAGNOSIS — Z30.2 ENCOUNTER FOR VASECTOMY: Primary | ICD-10-CM

## 2021-12-17 LAB — SEMEN ANALYSIS P VAS PNL: NORMAL

## 2021-12-17 PROCEDURE — 89321 SEMEN ANAL SPERM DETECTION: CPT

## 2022-03-30 ENCOUNTER — OFFICE VISIT (OUTPATIENT)
Dept: FAMILY MEDICINE | Facility: CLINIC | Age: 42
End: 2022-03-30
Payer: COMMERCIAL

## 2022-03-30 ENCOUNTER — HOSPITAL ENCOUNTER (OUTPATIENT)
Dept: ULTRASOUND IMAGING | Facility: HOSPITAL | Age: 42
Discharge: HOME | End: 2022-03-30
Attending: FAMILY MEDICINE | Admitting: FAMILY MEDICINE
Payer: COMMERCIAL

## 2022-03-30 VITALS
HEART RATE: 63 BPM | OXYGEN SATURATION: 96 % | HEIGHT: 70 IN | BODY MASS INDEX: 33.04 KG/M2 | DIASTOLIC BLOOD PRESSURE: 80 MMHG | SYSTOLIC BLOOD PRESSURE: 122 MMHG | WEIGHT: 230.8 LBS

## 2022-03-30 DIAGNOSIS — M79.671 RIGHT FOOT PAIN: ICD-10-CM

## 2022-03-30 DIAGNOSIS — M79.89 SWELLING OF CALF: ICD-10-CM

## 2022-03-30 LAB — URATE SERPL-MCNC: 6.5 MG/DL (ref 3–8)

## 2022-03-30 PROCEDURE — 84550 ASSAY OF BLOOD/URIC ACID: CPT | Performed by: FAMILY MEDICINE

## 2022-03-30 PROCEDURE — 93971 EXTREMITY STUDY: CPT | Mod: RT

## 2022-03-30 PROCEDURE — 36415 COLL VENOUS BLD VENIPUNCTURE: CPT | Performed by: FAMILY MEDICINE

## 2022-03-30 PROCEDURE — 99213 OFFICE O/P EST LOW 20 MIN: CPT | Performed by: FAMILY MEDICINE

## 2022-03-30 NOTE — LETTER
April 1, 2022      Erwin Soriano  [address] [place] [address]        Dear ,  We are writing to inform you of your test results.    [place]:  Your uric acid is in the upper range of normal, but is not excessively high.  If the knee pain continues to occur intermittently a trial of low dose allopurinol could be considered to lower the uric acid a bit further.    If you would like to follow up with Podiatry for your foot pain just respond with a request on Exepron and I will place a referral.    Resulted Orders   Uric acid   Result Value Ref Range    Uric Acid 6.5 3.0 - 8.0 mg/dL       If you have any questions or concerns, please call the clinic at the number listed above.       Sincerely,      Chavo Velasquez MD

## 2022-03-30 NOTE — PATIENT INSTRUCTIONS
Hold and call right calf U/S to rule out DVT    Check uric acid to assess gout risk.    If ultrasound and uric acid are normal then I would consult with Podiatry.

## 2022-03-30 NOTE — PROGRESS NOTES
"DIAGNOSIS:  Encounter Diagnoses   Name Primary?     Right foot pain      Swelling of calf, right, r/o early DVT         PLAN:     Hold and call right calf U/S to rule out DVT    Check uric acid to assess gout risk.    If ultrasound and uric acid are normal then I would consult with Podiatry.    Xray of the foot personally read (neg for fx) and reviewed with the pt.          HPI:    Right foot pain for 1 1/2 weeks.  No trauma or injury.  Hurts all the time when walking on it.  Has been adjusting how he walks so his calf is a bit sore as well.  Travels a lot.        No current outpatient medications on file.     No current facility-administered medications for this visit.       Pmh: reviewed  Psh: reviewed  Allergy:  reviewed      EXAM:    /80 (BP Location: Left arm, Patient Position: Sitting, Cuff Size: Adult Large)   Pulse 63   Ht 1.778 m (5' 10\")   Wt 104.7 kg (230 lb 12.8 oz)   SpO2 96%   BMI 33.12 kg/m    GEN:   ALERT, NAD, ORIENTED TIMES THREE  LUNGS: CTA  COR: RRR WITHOUT MURMUR  MS: TX IN THE MID PLANTAR RIGHT  FOOT;  RIGHT CALF SLIGHTLY SWOLLEN IN COMPARISON TO THE LEFT (1CM GREATER MAX CALF CIRCUFERENCE),  NO REDNESS, OR WARMTH, VERY SL RIGHT CALF TX  EXT: WITHOUT EDEMA/SWELLING      Answers for HPI/ROS submitted by the patient on 3/30/2022  How many servings of fruits and vegetables do you eat daily?: 0-1  On average, how many sweetened beverages do you drink each day (Examples: soda, juice, sweet tea, etc.  Do NOT count diet or artificially sweetened beverages)?: 1  How many minutes a day do you exercise enough to make your heart beat faster?: 9 or less  How many days a week do you exercise enough to make your heart beat faster?: 3 or less  How many days per week do you miss taking your medication?: 0  What is the reason for your visit today?: Foot hurt  When did your symptoms begin?: 1-2 weeks ago  What are your symptoms?: Foot hurts  How would you describe these symptoms?: Moderate  Are your " symptoms:: Staying the same  Have you had these symptoms before?: No  Is there anything that makes you feel worse?: Walking  Is there anything that makes you feel better?: No

## 2022-07-23 ENCOUNTER — HEALTH MAINTENANCE LETTER (OUTPATIENT)
Age: 42
End: 2022-07-23

## 2022-09-13 ENCOUNTER — HOSPITAL ENCOUNTER (EMERGENCY)
Facility: HOSPITAL | Age: 42
Discharge: HOME | End: 2022-09-13
Attending: EMERGENCY MEDICINE | Admitting: EMERGENCY MEDICINE
Payer: COMMERCIAL

## 2022-09-13 VITALS
OXYGEN SATURATION: 98 % | HEIGHT: 71 IN | BODY MASS INDEX: 31.22 KG/M2 | DIASTOLIC BLOOD PRESSURE: 78 MMHG | RESPIRATION RATE: 16 BRPM | WEIGHT: 223 LBS | HEART RATE: 67 BPM | SYSTOLIC BLOOD PRESSURE: 116 MMHG | TEMPERATURE: 97.6 F

## 2022-09-13 DIAGNOSIS — M79.602 PAIN OF LEFT UPPER EXTREMITY: ICD-10-CM

## 2022-09-13 PROCEDURE — 99283 EMERGENCY DEPT VISIT LOW MDM: CPT | Performed by: EMERGENCY MEDICINE

## 2022-09-13 PROCEDURE — 250N000013 HC RX MED GY IP 250 OP 250 PS 637: Performed by: EMERGENCY MEDICINE

## 2022-09-13 PROCEDURE — 250N000011 HC RX IP 250 OP 636: Performed by: EMERGENCY MEDICINE

## 2022-09-13 PROCEDURE — 93005 ELECTROCARDIOGRAM TRACING: CPT | Performed by: EMERGENCY MEDICINE

## 2022-09-13 PROCEDURE — 93005 ELECTROCARDIOGRAM TRACING: CPT | Performed by: STUDENT IN AN ORGANIZED HEALTH CARE EDUCATION/TRAINING PROGRAM

## 2022-09-13 RX ORDER — IBUPROFEN 600 MG/1
600 TABLET, FILM COATED ORAL ONCE
Status: COMPLETED | OUTPATIENT
Start: 2022-09-14 | End: 2022-09-13

## 2022-09-13 RX ORDER — IBUPROFEN 600 MG/1
600 TABLET, FILM COATED ORAL EVERY 8 HOURS
Qty: 30 TABLET | Refills: 0 | Status: SHIPPED | OUTPATIENT
Start: 2022-09-13

## 2022-09-13 RX ADMIN — IBUPROFEN 600 MG: 600 TABLET ORAL at 23:54

## 2022-09-13 RX ADMIN — DEXAMETHASONE 10 MG: 2 TABLET ORAL at 23:54

## 2022-09-14 NOTE — ED TRIAGE NOTES
Pt arrived with c/o left sided arm pain that radiates to his shoulder since Sunday. Pt reports falling asleep on the couch and waking up with arm pain. Rating pain 5/10. Pt reports cardiac history from childhood.

## 2022-09-14 NOTE — ED PROVIDER NOTES
EMERGENCY DEPARTMENT ENCOUNTER      NAME: Erwin Soriano  AGE: 41 year old male  YOB: 1980  MRN: [id number]  EVALUATION DATE & TIME: No admission date for patient encounter.    PCP: Antoine Hester    ED PROVIDER: Lucio Myrick M.D.      Chief Complaint   Patient presents with     Arm Pain         FINAL IMPRESSION:  1. Pain of left upper extremity          ED COURSE & MEDICAL DECISION MAKING:    Pertinent Labs & Imaging studies reviewed. (See chart for details)  41 year old male presents to the Emergency Department for evaluation of left arm achiness.  Describes it as just a dull ache mostly in the forearm.  Noted it after getting up on the weekend.  Denies any obvious overexertion's or injuries.  On exam he has good range of motion.  There is mild tenderness over the deltoid shield.  Capillary refill is normal and pulses are full.  Sensation normal.  Patient with acute myalgias without evidence of significant pathology.  Recommendations for ibuprofen.  Patient given initial dose of Decadron and ibuprofen here to expedite recovery.  Patient appears non toxic with stable vitals signs. Overall exam is benign.      11:41 PM I met with the patient for the initial interview and physical examination. Discussed plan for treatment and workup in the ED.  This patient's care was rendered during the COVID-19 pandemic and care commenced initially through triage due to overwhelmed hospital systems with extremely long wait times and boarding patients pending admission with limited availability of emergency department rooms and nursing staff for evaluation and work-up.  11:47 PM I discussed the plan for discharge with the patient, and patient is agreeable. We discussed supportive cares at home and reasons for return to the ER including new or worsening symptoms - all questions and concerns addressed. Patient to be discharged by RN.       At the conclusion of the encounter I discussed the results of all of the  tests and the disposition. The questions were answered and return precautions provided. The patient or family acknowledged understanding and was agreeable with the care plan.       PPE: Provider wore gloves, eye protection, and paper mask.     MEDICATIONS GIVEN IN THE EMERGENCY:  Medications   ibuprofen (ADVIL/MOTRIN) tablet 600 mg (600 mg Oral Given 9/13/22 9724)   dexamethasone (DECADRON) tablet 10 mg (10 mg Oral Given 9/13/22 2354)       NEW PRESCRIPTIONS STARTED AT TODAY'S ER VISIT  Discharge Medication List as of 9/13/2022 11:54 PM      START taking these medications    Details   ibuprofen (ADVIL/MOTRIN) 600 MG tablet Take 1 tablet (600 mg) by mouth every 8 hours, Disp-30 tablet, R-0, Local Print                =================================================================    HPI    Patient information was obtained from: Patient     Use of Intrepreter: N/A      Erwin Soriano is a 41 year old male with no pertient medical history who presents to the ED for evaluation of arm pain. Patient reports falling asleep on the couch Saturday night (9/10) and waking up the next morning with shooting left arm pain that radiates into his left shoulder. Denies performing new or strenuous activities on Saturday. He notes pain is more noticeable when he is sitting and not doing anything. He reports taking a dose of Tylenol tonight without relief for pain. Patient denies any additional complaints at this time.       REVIEW OF SYSTEMS   Constitutional:  Denies fever, chills  Respiratory:  Denies productive cough or increased work of breathing  Cardiovascular:  Denies chest pain, palpitations  GI:  Denies abdominal pain, nausea, vomiting, or change in bowel or bladder habits   Musculoskeletal:  Endorses left arm pain.  Skin:  Denies rash   Neurologic:  Denies focal weakness  All systems negative except as marked.     PAST MEDICAL HISTORY:  History reviewed. No pertinent past medical history.    PAST SURGICAL HISTORY:  History  "reviewed. No pertinent surgical history.      CURRENT MEDICATIONS:    No current facility-administered medications for this encounter.    Current Outpatient Medications:      ibuprofen (ADVIL/MOTRIN) 600 MG tablet, Take 1 tablet (600 mg) by mouth every 8 hours, Disp: 30 tablet, Rfl: 0    ALLERGIES:  Allergies   Allergen Reactions     Amoxicillin Rash     At childhood       FAMILY HISTORY:  Family History   Problem Relation Age of Onset     Chronic Obstructive Pulmonary Disease Father      No Known Problems Sister      No Known Problems Brother      Colon Cancer Paternal Grandmother      Cancer Paternal Grandfather        SOCIAL HISTORY:   Social History     Socioeconomic History     Marital status:      Spouse name: None     Number of children: None     Years of education: None     Highest education level: None   Tobacco Use     Smoking status: Former Smoker     Smokeless tobacco: Former User     Types: Chew       VITALS:  Patient Vitals for the past 24 hrs:   BP Temp Temp src Pulse Resp SpO2 Height Weight   09/13/22 2304 116/78 97.6  F (36.4  C) Tympanic 67 16 98 % 1.803 m (5' 11\") 101.2 kg (223 lb)        PHYSICAL EXAM    Constitutional:  Awake, alert, in no apparent distress  HENT:  Normocephalic, Atraumatic. Bilateral external ears normal. Oropharynx moist. Nose normal. Neck- Normal range of motion   Eyes:  PERRL, EOMI with no signs of entrapment, Conjunctiva normal, No discharge.   Respiratory:  Normal breath sounds, No respiratory distress, No wheezing.    Cardiovascular:  Normal heart rate, Normal rhythm, No appreciable rubs or gallops.   Musculoskeletal:  Intact distal pulses, No edema. Minimal tenderness over left deltoid shield. Good range of motion in all major joints. No tenderness to palpation or major deformities noted.  Integument:  Warm, Dry, No erythema, No rash.   Neurologic:  Alert & oriented, Normal motor function, Normal sensory function, No focal deficits noted.   Psychiatric:  Affect " normal, Judgment normal, Mood normal.             I, Clifford Balderrama, am serving as a scribe to document services personally performed by Lucio Myrick MD, based on my observation and the provider's statements to me. I, Lucio Myrick MD attest that Clifford Balderrama is acting in a scribe capacity, has observed my performance of the services and has documented them in accordance with my direction.    Lucio Myrick M.D.  Emergency Medicine  [facility] EMERGENCY DEPARTMENT     Lucio Myrick MD  09/14/22 0549

## 2022-09-14 NOTE — ED TRIAGE NOTES
Pt reports cardiac history from childhood.      Triage Assessment     Row Name 09/13/22 2464       Triage Assessment (Adult)    Airway WDL WDL       Respiratory WDL    Respiratory WDL WDL       Skin Circulation/Temperature WDL    Skin Circulation/Temperature WDL WDL       Cardiac WDL    Cardiac WDL WDL       Peripheral/Neurovascular WDL    Peripheral Neurovascular WDL WDL       Cognitive/Neuro/Behavioral WDL    Cognitive/Neuro/Behavioral WDL WDL

## 2022-09-21 ENCOUNTER — OFFICE VISIT (OUTPATIENT)
Dept: FAMILY MEDICINE | Facility: CLINIC | Age: 42
End: 2022-09-21
Payer: COMMERCIAL

## 2022-09-21 VITALS
DIASTOLIC BLOOD PRESSURE: 84 MMHG | TEMPERATURE: 97.4 F | HEIGHT: 71 IN | OXYGEN SATURATION: 98 % | WEIGHT: 223.4 LBS | HEART RATE: 64 BPM | BODY MASS INDEX: 31.27 KG/M2 | RESPIRATION RATE: 16 BRPM | SYSTOLIC BLOOD PRESSURE: 112 MMHG

## 2022-09-21 DIAGNOSIS — E66.09 CLASS 1 OBESITY DUE TO EXCESS CALORIES WITHOUT SERIOUS COMORBIDITY WITH BODY MASS INDEX (BMI) OF 30.0 TO 30.9 IN ADULT: Primary | ICD-10-CM

## 2022-09-21 DIAGNOSIS — E66.811 CLASS 1 OBESITY DUE TO EXCESS CALORIES WITHOUT SERIOUS COMORBIDITY WITH BODY MASS INDEX (BMI) OF 30.0 TO 30.9 IN ADULT: Primary | ICD-10-CM

## 2022-09-21 DIAGNOSIS — D17.30 LIPOMA OF SKIN AND SUBCUTANEOUS TISSUE: ICD-10-CM

## 2022-09-21 PROCEDURE — 99213 OFFICE O/P EST LOW 20 MIN: CPT | Performed by: FAMILY MEDICINE

## 2022-09-21 ASSESSMENT — PAIN SCALES - GENERAL: PAINLEVEL: NO PAIN (0)

## 2022-09-21 NOTE — PROGRESS NOTES
SUBJECTIVE:                                                    Erwin Soriano is a 41 year old male who presents to clinic today for the following health issues:    Concern - lump on left side  Onset: a few days    Description:   He says that he was feeling some pain in his side so he went to rub and that's when he felt it. He says it is about the size of  [place] and irritated.    Intensity: mild    Progression of Symptoms:  same    Accompanying Signs & Symptoms:  none    Previous history of similar problem:   none    Precipitating factors:   Worsened by: movement clothes ribbing on the area makes it more irritated.    Alleviating factors:  Improved by: none    Therapies Tried and outcome: none    Answers for HPI/ROS submitted by the patient on 9/21/2022  How many servings of fruits and vegetables do you eat daily?: 0-1  On average, how many sweetened beverages do you drink each day (Examples: soda, juice, sweet tea, etc.  Do NOT count diet or artificially sweetened beverages)?: 1  How many minutes a day do you exercise enough to make your heart beat faster?: 9 or less  How many days a week do you exercise enough to make your heart beat faster?: 3 or less  How many days per week do you miss taking your medication?: 0  What is the reason for your visit today?: Lump on side  When did your symptoms begin?: 3-7 days ago    Problem list and histories reviewed & adjusted, as indicated.  Additional history:     Patient Active Problem List   Diagnosis     Gastroesophageal reflux disease without esophagitis     History reviewed. No pertinent surgical history.    Social History     Tobacco Use     Smoking status: Former Smoker     Smokeless tobacco: Former User     Types: Chew   Substance Use Topics     Alcohol use: Not on file     Family History   Problem Relation Age of Onset     Chronic Obstructive Pulmonary Disease Father      No Known Problems Sister      No Known Problems Brother      Colon Cancer Paternal Grandmother   "    Cancer Paternal Grandfather          Current Outpatient Medications   Medication Sig Dispense Refill     ibuprofen (ADVIL/MOTRIN) 600 MG tablet Take 1 tablet (600 mg) by mouth every 8 hours 30 tablet 0     Allergies   Allergen Reactions     Amoxicillin Rash     At childhood     Recent Labs   Lab Test 06/02/21  0748 06/14/20  0500 11/26/19  0831 11/26/19  0831 11/13/17  1029     --   --  104 96   HDL 44  --   --  44 41   TRIG 47  --   --  66 39   ALT  --   --   --  24  --    CR 1.09 1.19   < > 1.22  --    GFRESTIMATED >60 >60   < > >60  --    GFRESTBLACK >60 >60   < > >60  --    POTASSIUM 4.1 4.0   < > 4.1  --     < > = values in this interval not displayed.        ROS:  Constitutional, HEENT, cardiovascular, pulmonary, gi and gu systems are negative, except as otherwise noted.    OBJECTIVE:                                                    /84   Pulse 64   Temp 97.4  F (36.3  C) (Tympanic)   Resp 16   Ht 1.803 m (5' 11\")   Wt 101.3 kg (223 lb 6.4 oz)   SpO2 98%   BMI 31.16 kg/m   Body mass index is 31.16 kg/m .   GENERAL: healthy, alert, well nourished, well hydrated, no distress  HENT: ear canals- normal; TMs- normal; Nose- normal; Mouth- no ulcers, no lesions  NECK: no tenderness, no adenopathy, no asymmetry, no masses, no stiffness; thyroid- normal to palpation  RESP: lungs clear to auscultation - no rales, no rhonchi, no wheezes  CV: regular rates and rhythm, normal S1 S2, no S3 or S4 and no murmur, no click or rub -  ABDOMEN:has 1 cm well circumscribed lesion soft mobile.  soft, no tenderness, no  hepatosplenomegaly, no masses, normal bowel sounds  Skin:  Has a few stretch marks on sides       ASSESSMENT/PLAN:                                                      (E66.09,  Z68.30) Class 1 obesity due to excess calories without serious comorbidity with body mass index (BMI) of 30.0 to 30.9 in adult  (primary encounter diagnosis)  Fairly rapid weight gain per patient.  Hs stretch marks.  " Will decrease carbs calories in diet and increase exercise.     (D17.30) Lipoma of skin and subcutaneous tissue   I discused it is the size of a grape  He shoud observe. If it increases in size  He shuld see surgery for excisoin.        reports that he has quit smoking. He has quit using smokeless tobacco.  His smokeless tobacco use included chew.      Weight management plan: Discussed healthy diet and exercise guidelines      [facility]

## 2022-10-01 ENCOUNTER — HEALTH MAINTENANCE LETTER (OUTPATIENT)
Age: 42
End: 2022-10-01

## 2023-01-11 ENCOUNTER — NURSE TRIAGE (OUTPATIENT)
Dept: NURSING | Facility: CLINIC | Age: 43
End: 2023-01-11

## 2023-01-11 NOTE — TELEPHONE ENCOUNTER
Nurse Triage SBAR    Is this a 2nd Level Triage? NO    Situation: Significant Other Diane calling with issues with chest pain and acid reflux, requesting appointment.  Consent: on file in chart    Background:  Feels like he has acid reflux after eating but now worried that it is more chest pain.  He can feel it in his chest.  She is concerned that he may be having a heart attack, and would like to be seen today or tomorrow.  He is NOT currently with her, so unable to fully triage.    Assessment:  Chest pain comes and goes. Not currently having chest pain at this time.  Happens after eating most of the time.  He denies feeling short of breath when this happens, and no chest tightness/heaviness. Pain does not radiate anywhere. No cough, URI symptoms. He has not tried anything until today, for the pain.  He has a script for famotidine, but has not been taking it. He did take it this AM, but she is not with him now, so is not sure if it worked.   She is also not sure if taking Tums or Mylanta has helped him in the past. Unsure if it is the same as previously diagnosed heartburn, or if he has a sour taste in his mouth.    Protocol Recommended Disposition:   Go To ED/UCC Now (Or To Office With PCP Approval)    Recommendation: Advised patient's wife to have him Go to urgent care. Care advice reviewed.  Reviewed concerning symptoms and when to call back.   She verbalized understanding and stated that she would take him to urgent care for evaluation today.      Geeta Abernathy [facility] 1/11/2023 1:22 PM      Reason for Disposition    Chest pain lasting longer than 5 minutes and occurred in last 3 days (72 hours) (Exception: feels exactly the same as previously diagnosed heartburn and has accompanying sour taste in mouth)    Additional Information    Negative: SEVERE difficulty breathing (e.g., struggling for each breath, speaks in single words)    Negative: Passed out (i.e., fainted, collapsed and was not  responding)    Negative: Difficult to awaken or acting confused (e.g., disoriented, slurred speech)    Negative: Shock suspected (e.g., cold/pale/clammy skin, too weak to stand, low BP, rapid pulse)    Negative: Chest pain lasting longer than 5 minutes and ANY of the following:* Over 44 years old* Over 30 years old and at least one cardiac risk factor (e.g., diabetes mellitus, high blood pressure, high cholesterol, smoker, or strong family history of heart disease)* History of heart disease (i.e., angina, heart attack, heart failure, bypass surgery, takes nitroglycerin)* Pain is crushing, pressure-like, or heavy    Negative: Heart beating < 50 beats per minute OR > 140 beats per minute    Negative: Visible sweat on face or sweat dripping down face    Negative: Sounds like a life-threatening emergency to the triager    Negative: Followed an injury to chest    Negative: SEVERE chest pain    Negative: Pain also in shoulder(s) or arm(s) or jaw    Negative: Difficulty breathing    Negative: Cocaine use within last 3 days    Negative: Major surgery in the past month    Negative: Hip or leg fracture (broken bone) in past month (or had cast on leg or ankle in past month)    Negative: Illness requiring prolonged bedrest in past month (e.g., immobilization, long hospital stay)    Negative: Long-distance travel in past month (e.g., car, bus, train, plane; with trip lasting 6 or more hours)    Negative: History of prior 'blood clot' in leg or lungs (i.e., deep vein thrombosis, pulmonary embolism)    Negative: History of inherited increased risk of blood clots (e.g., Factor 5 Leiden, Anti-thrombin 3, Protein C or Protein S deficiency, Prothrombin mutation)    Negative: Cancer treatment in the past two months (or has cancer now)    Negative: Heart beating irregularly or very rapidly    Protocols used: CHEST PAIN-A-[place]

## 2023-01-13 ENCOUNTER — LAB REQUISITION (OUTPATIENT)
Dept: LAB | Facility: CLINIC | Age: 43
End: 2023-01-13
Payer: COMMERCIAL

## 2023-01-13 DIAGNOSIS — Z13.220 ENCOUNTER FOR SCREENING FOR LIPOID DISORDERS: ICD-10-CM

## 2023-01-13 DIAGNOSIS — R00.1 BRADYCARDIA, UNSPECIFIED: ICD-10-CM

## 2023-01-13 DIAGNOSIS — R10.13 EPIGASTRIC PAIN: ICD-10-CM

## 2023-01-13 LAB
ALBUMIN SERPL BCG-MCNC: 4.6 G/DL (ref 3.5–5.2)
ALP SERPL-CCNC: 91 U/L (ref 40–129)
ALT SERPL W P-5'-P-CCNC: 27 U/L (ref 10–50)
AMYLASE SERPL-CCNC: 57 U/L (ref 28–100)
ANION GAP SERPL CALCULATED.3IONS-SCNC: 14 MMOL/L (ref 7–15)
AST SERPL W P-5'-P-CCNC: 22 U/L (ref 10–50)
BILIRUB SERPL-MCNC: 0.7 MG/DL
BUN SERPL-MCNC: 14.2 MG/DL (ref 6–20)
CALCIUM SERPL-MCNC: 9.7 MG/DL (ref 8.6–10)
CHLORIDE SERPL-SCNC: 106 MMOL/L (ref 98–107)
CHOLEST SERPL-MCNC: 154 MG/DL
CREAT SERPL-MCNC: 1.11 MG/DL (ref 0.67–1.17)
DEPRECATED HCO3 PLAS-SCNC: 24 MMOL/L (ref 22–29)
GFR SERPL CREATININE-BSD FRML MDRD: 85 ML/MIN/1.73M2
GLUCOSE SERPL-MCNC: 93 MG/DL (ref 70–99)
HDLC SERPL-MCNC: 51 MG/DL
LDLC SERPL CALC-MCNC: 92 MG/DL
LIPASE SERPL-CCNC: 31 U/L (ref 13–60)
NONHDLC SERPL-MCNC: 103 MG/DL
POTASSIUM SERPL-SCNC: 4.4 MMOL/L (ref 3.4–5.3)
PROT SERPL-MCNC: 7.4 G/DL (ref 6.4–8.3)
SODIUM SERPL-SCNC: 144 MMOL/L (ref 136–145)
TRIGL SERPL-MCNC: 55 MG/DL
TSH SERPL DL<=0.005 MIU/L-ACNC: 1.37 UIU/ML (ref 0.3–4.2)

## 2023-01-13 PROCEDURE — 84443 ASSAY THYROID STIM HORMONE: CPT | Mod: ORL | Performed by: PHYSICIAN ASSISTANT

## 2023-01-13 PROCEDURE — 80061 LIPID PANEL: CPT | Mod: ORL | Performed by: PHYSICIAN ASSISTANT

## 2023-01-13 PROCEDURE — 82150 ASSAY OF AMYLASE: CPT | Mod: ORL | Performed by: PHYSICIAN ASSISTANT

## 2023-01-13 PROCEDURE — 80053 COMPREHEN METABOLIC PANEL: CPT | Mod: ORL | Performed by: PHYSICIAN ASSISTANT

## 2023-01-13 PROCEDURE — 83690 ASSAY OF LIPASE: CPT | Mod: ORL | Performed by: PHYSICIAN ASSISTANT

## 2023-01-25 ENCOUNTER — LAB REQUISITION (OUTPATIENT)
Dept: LAB | Facility: CLINIC | Age: 43
End: 2023-01-25
Payer: COMMERCIAL

## 2023-01-25 DIAGNOSIS — R10.13 EPIGASTRIC PAIN: ICD-10-CM

## 2023-01-25 PROCEDURE — 87338 HPYLORI STOOL AG IA: CPT | Mod: ORL | Performed by: PHYSICIAN ASSISTANT

## 2023-01-27 LAB — H PYLORI AG STL QL IA: NEGATIVE

## 2023-05-15 ENCOUNTER — TRANSFERRED RECORDS (OUTPATIENT)
Dept: HEALTH INFORMATION MANAGEMENT | Facility: CLINIC | Age: 43
End: 2023-05-15
Payer: COMMERCIAL

## 2023-07-21 ENCOUNTER — LAB REQUISITION (OUTPATIENT)
Dept: LAB | Facility: CLINIC | Age: 43
End: 2023-07-21
Payer: COMMERCIAL

## 2023-07-21 DIAGNOSIS — R00.2 PALPITATIONS: ICD-10-CM

## 2023-07-21 LAB
ANION GAP SERPL CALCULATED.3IONS-SCNC: 11 MMOL/L (ref 7–15)
BUN SERPL-MCNC: 13.6 MG/DL (ref 6–20)
CALCIUM SERPL-MCNC: 8.9 MG/DL (ref 8.6–10)
CHLORIDE SERPL-SCNC: 106 MMOL/L (ref 98–107)
CREAT SERPL-MCNC: 1.23 MG/DL (ref 0.67–1.17)
DEPRECATED HCO3 PLAS-SCNC: 27 MMOL/L (ref 22–29)
GFR SERPL CREATININE-BSD FRML MDRD: 75 ML/MIN/1.73M2
GLUCOSE SERPL-MCNC: 91 MG/DL (ref 70–99)
POTASSIUM SERPL-SCNC: 4.4 MMOL/L (ref 3.4–5.3)
SODIUM SERPL-SCNC: 144 MMOL/L (ref 136–145)
TSH SERPL DL<=0.005 MIU/L-ACNC: 1.01 UIU/ML (ref 0.3–4.2)

## 2023-07-21 PROCEDURE — 80048 BASIC METABOLIC PNL TOTAL CA: CPT | Mod: ORL | Performed by: FAMILY MEDICINE

## 2023-07-21 PROCEDURE — 84443 ASSAY THYROID STIM HORMONE: CPT | Mod: ORL | Performed by: FAMILY MEDICINE

## 2023-07-26 ENCOUNTER — TELEPHONE (OUTPATIENT)
Dept: FAMILY MEDICINE | Facility: CLINIC | Age: 43
End: 2023-07-26

## 2023-07-26 NOTE — TELEPHONE ENCOUNTER
Patient Quality Outreach    Patient is due for the following:   Depression  -  screening  Physical Preventive Adult Physical      Topic Date Due    Hepatitis B Vaccine (2 of 3 - 3-dose series) 10/07/1998    COVID-19 Vaccine (4 - Pfizer series) 02/09/2022       Next Steps:   Schedule a Adult Preventative    Type of outreach:    Phone, spoke to patient/parent. [facility]      Questions for provider review:    None           Dariela Cardona  Chart routed to self.

## 2023-08-06 ENCOUNTER — HEALTH MAINTENANCE LETTER (OUTPATIENT)
Age: 43
End: 2023-08-06

## 2023-08-21 ENCOUNTER — HOSPITAL ENCOUNTER (EMERGENCY)
Facility: HOSPITAL | Age: 43
Discharge: HOME | End: 2023-08-21
Attending: EMERGENCY MEDICINE | Admitting: EMERGENCY MEDICINE
Payer: COMMERCIAL

## 2023-08-21 ENCOUNTER — APPOINTMENT (OUTPATIENT)
Dept: MRI IMAGING | Facility: HOSPITAL | Age: 43
End: 2023-08-21
Attending: EMERGENCY MEDICINE
Payer: COMMERCIAL

## 2023-08-21 ENCOUNTER — APPOINTMENT (OUTPATIENT)
Dept: CT IMAGING | Facility: HOSPITAL | Age: 43
End: 2023-08-21
Attending: STUDENT IN AN ORGANIZED HEALTH CARE EDUCATION/TRAINING PROGRAM
Payer: COMMERCIAL

## 2023-08-21 VITALS
SYSTOLIC BLOOD PRESSURE: 119 MMHG | BODY MASS INDEX: 32.2 KG/M2 | TEMPERATURE: 97.1 F | RESPIRATION RATE: 21 BRPM | HEIGHT: 71 IN | OXYGEN SATURATION: 96 % | DIASTOLIC BLOOD PRESSURE: 73 MMHG | WEIGHT: 230 LBS | HEART RATE: 72 BPM

## 2023-08-21 DIAGNOSIS — R51.9 ACUTE NONINTRACTABLE HEADACHE, UNSPECIFIED HEADACHE TYPE: ICD-10-CM

## 2023-08-21 DIAGNOSIS — R20.0 NUMBNESS: ICD-10-CM

## 2023-08-21 LAB
AMPHETAMINES UR QL SCN: NORMAL
ANION GAP SERPL CALCULATED.3IONS-SCNC: 11 MMOL/L (ref 7–15)
APTT PPP: 29 SECONDS (ref 22–38)
BARBITURATES UR QL SCN: NORMAL
BASOPHILS # BLD AUTO: 0 10E3/UL (ref 0–0.2)
BASOPHILS NFR BLD AUTO: 1 %
BENZODIAZ UR QL SCN: NORMAL
BUN SERPL-MCNC: 15.1 MG/DL (ref 6–20)
BZE UR QL SCN: NORMAL
CALCIUM SERPL-MCNC: 9.1 MG/DL (ref 8.6–10)
CANNABINOIDS UR QL SCN: NORMAL
CHLORIDE SERPL-SCNC: 102 MMOL/L (ref 98–107)
CREAT SERPL-MCNC: 1.11 MG/DL (ref 0.67–1.17)
DEPRECATED HCO3 PLAS-SCNC: 24 MMOL/L (ref 22–29)
EOSINOPHIL # BLD AUTO: 0.2 10E3/UL (ref 0–0.7)
EOSINOPHIL NFR BLD AUTO: 2 %
ERYTHROCYTE [DISTWIDTH] IN BLOOD BY AUTOMATED COUNT: 13 % (ref 10–15)
ETHANOL SERPL-MCNC: <0.01 G/DL
GFR SERPL CREATININE-BSD FRML MDRD: 85 ML/MIN/1.73M2
GLUCOSE BLDC GLUCOMTR-MCNC: 92 MG/DL (ref 70–99)
GLUCOSE SERPL-MCNC: 158 MG/DL (ref 70–99)
HCT VFR BLD AUTO: 45.1 % (ref 40–53)
HGB BLD-MCNC: 15.6 G/DL (ref 13.3–17.7)
HOLD SPECIMEN: NORMAL
IMM GRANULOCYTES # BLD: 0 10E3/UL
IMM GRANULOCYTES NFR BLD: 0 %
INR PPP: 1.03 (ref 0.85–1.15)
LYMPHOCYTES # BLD AUTO: 2.6 10E3/UL (ref 0.8–5.3)
LYMPHOCYTES NFR BLD AUTO: 31 %
MCH RBC QN AUTO: 30.3 PG (ref 26.5–33)
MCHC RBC AUTO-ENTMCNC: 34.6 G/DL (ref 31.5–36.5)
MCV RBC AUTO: 88 FL (ref 78–100)
MONOCYTES # BLD AUTO: 0.5 10E3/UL (ref 0–1.3)
MONOCYTES NFR BLD AUTO: 6 %
NEUTROPHILS # BLD AUTO: 5.1 10E3/UL (ref 1.6–8.3)
NEUTROPHILS NFR BLD AUTO: 60 %
NRBC # BLD AUTO: 0 10E3/UL
NRBC BLD AUTO-RTO: 0 /100
OPIATES UR QL SCN: NORMAL
PCP QUAL URINE (ROCHE): NORMAL
PLATELET # BLD AUTO: 184 10E3/UL (ref 150–450)
POTASSIUM SERPL-SCNC: 3.4 MMOL/L (ref 3.4–5.3)
RBC # BLD AUTO: 5.15 10E6/UL (ref 4.4–5.9)
SODIUM SERPL-SCNC: 137 MMOL/L (ref 136–145)
TROPONIN T SERPL HS-MCNC: <6 NG/L
WBC # BLD AUTO: 8.3 10E3/UL (ref 4–11)

## 2023-08-21 PROCEDURE — 250N000011 HC RX IP 250 OP 636: Mod: JZ | Performed by: EMERGENCY MEDICINE

## 2023-08-21 PROCEDURE — A9585 GADOBUTROL INJECTION: HCPCS | Mod: JZ | Performed by: EMERGENCY MEDICINE

## 2023-08-21 PROCEDURE — 82962 GLUCOSE BLOOD TEST: CPT

## 2023-08-21 PROCEDURE — 70498 CT ANGIOGRAPHY NECK: CPT

## 2023-08-21 PROCEDURE — 36415 COLL VENOUS BLD VENIPUNCTURE: CPT | Performed by: STUDENT IN AN ORGANIZED HEALTH CARE EDUCATION/TRAINING PROGRAM

## 2023-08-21 PROCEDURE — 93005 ELECTROCARDIOGRAM TRACING: CPT | Performed by: STUDENT IN AN ORGANIZED HEALTH CARE EDUCATION/TRAINING PROGRAM

## 2023-08-21 PROCEDURE — 85730 THROMBOPLASTIN TIME PARTIAL: CPT | Performed by: STUDENT IN AN ORGANIZED HEALTH CARE EDUCATION/TRAINING PROGRAM

## 2023-08-21 PROCEDURE — 82077 ASSAY SPEC XCP UR&BREATH IA: CPT | Performed by: EMERGENCY MEDICINE

## 2023-08-21 PROCEDURE — 70553 MRI BRAIN STEM W/O & W/DYE: CPT

## 2023-08-21 PROCEDURE — 80307 DRUG TEST PRSMV CHEM ANLYZR: CPT | Performed by: EMERGENCY MEDICINE

## 2023-08-21 PROCEDURE — 80048 BASIC METABOLIC PNL TOTAL CA: CPT | Performed by: STUDENT IN AN ORGANIZED HEALTH CARE EDUCATION/TRAINING PROGRAM

## 2023-08-21 PROCEDURE — 70496 CT ANGIOGRAPHY HEAD: CPT

## 2023-08-21 PROCEDURE — 84484 ASSAY OF TROPONIN QUANT: CPT | Performed by: STUDENT IN AN ORGANIZED HEALTH CARE EDUCATION/TRAINING PROGRAM

## 2023-08-21 PROCEDURE — 85014 HEMATOCRIT: CPT | Performed by: STUDENT IN AN ORGANIZED HEALTH CARE EDUCATION/TRAINING PROGRAM

## 2023-08-21 PROCEDURE — 255N000002 HC RX 255 OP 636: Mod: JZ | Performed by: EMERGENCY MEDICINE

## 2023-08-21 PROCEDURE — 85610 PROTHROMBIN TIME: CPT | Performed by: STUDENT IN AN ORGANIZED HEALTH CARE EDUCATION/TRAINING PROGRAM

## 2023-08-21 PROCEDURE — 99285 EMERGENCY DEPT VISIT HI MDM: CPT | Mod: 25

## 2023-08-21 RX ORDER — IOPAMIDOL 755 MG/ML
75 INJECTION, SOLUTION INTRAVASCULAR ONCE
Status: COMPLETED | OUTPATIENT
Start: 2023-08-21 | End: 2023-08-21

## 2023-08-21 RX ORDER — GADOBUTROL 604.72 MG/ML
10 INJECTION INTRAVENOUS ONCE
Status: COMPLETED | OUTPATIENT
Start: 2023-08-21 | End: 2023-08-21

## 2023-08-21 RX ADMIN — IOPAMIDOL 75 ML: 755 INJECTION, SOLUTION INTRAVENOUS at 16:46

## 2023-08-21 RX ADMIN — GADOBUTROL 10 ML: 604.72 INJECTION INTRAVENOUS at 18:53

## 2023-08-21 ASSESSMENT — ACTIVITIES OF DAILY LIVING (ADL)
ADLS_ACUITY_SCORE: 35
ADLS_ACUITY_SCORE: 35

## 2023-08-21 NOTE — Clinical Note
Erwin Soriano was seen and treated in our emergency department on 8/21/2023.  He may return to work on 08/22/2023.       If you have any questions or concerns, please don't hesitate to call.      Gregorio Shaikh MD

## 2023-08-21 NOTE — ED TRIAGE NOTES
The pt states at 1530 today while he was driving he started feeling lightheadedness. Pt started taking vitamins today and did not take food with the vitamins. Pt states that he had a wave of numbness in bilateral hands that comes and goes. The pt felt a little off balance. Pt drove himself here.

## 2023-08-21 NOTE — ED PROVIDER NOTES
EMERGENCY DEPARTMENT ENCOUNTER      NAME: Erwin Soriano  AGE: 42 year old male  YOB: 1980  MRN: [id number]  EVALUATION DATE & TIME: 2023  4:35 PM    PCP: Antoine Hester    ED PROVIDER: Gregorio Shaikh MD       Chief Complaint   Patient presents with    Dizziness    Stroke Symptoms         FINAL IMPRESSION:  1. Acute nonintractable headache, unspecified headache type    2. Numbness          ED COURSE & MEDICAL DECISION MAKIN:30 PM I introduced myself to the patient, obtained patient history, performed a physical exam, and discussed plan for ED workup including potential diagnostic laboratory/imaging studies and interventions.  4:35 PM Tier 1 stroke code was called.  4:40 PM Spoke with stroke neurology, Dr. Pacheco.  4:57 PM I spoke with neuroradiologist.  5:00 PM Spoke with stroke neurology, deescalated stroke code.  5:23 PM Rechecked and updated the patient.  8:35 PM We discussed the plan for discharge and the patient is agreeable. Reviewed supportive cares, symptomatic treatment, outpatient follow up, and reasons to return to the Emergency Department. Patient to be discharged by ED RN.       Medical Decision Making    History:  Supplemental history from: N/A  External Record(s) reviewed: Documented in chart, if applicable.    Work Up:  Chart documentation includes differential considered and any EKGs or imaging independently interpreted by provider, where specified.  In additional to work up documented, I considered the following work up: Documented in chart, if applicable. and N/A    External consultation:  Discussion of management with another provider: Documented in chart, if applicable  Neurology  Complicating factors:  Care impacted by chronic illness: N/A  Care affected by social determinants of health: N/A    Disposition considerations: Discharge. No recommendations on prescription strength medication(s). I considered admission, but discharged patient after significant clinical  improvement.    Pertinent Labs & Imaging studies reviewed. (See chart for details)    42 year old male without significant history, presents to the Emergency Department for evaluation of headache and paresthesias. Given patient's lack of prior history and acute onset of symptoms with early presentation we did activate stroke code for early intervention.  Discussed with neurologist.  Eventually canceled stroke code given his unusual symptoms.  Non-debilitating and he was not a TNK candidate anyways given the low level symptoms with the high risk of TNK administration.  Patient symptomatic improvement while he was here without acute intervention.  Labs and EKG otherwise unremarkable.  We obtained an MRI to rule out other infectious or inflammatory causes.  This is negative.  Unclear source of the patient's paresthesias and symptoms possibly related to his mild headache.  No history of migraines though we will need to observe for a future pattern.  Stable for discharge at this time.  Encouraged rest and hydration tomorrow.         At the conclusion of the encounter I discussed the results of all of the tests and the disposition. The questions were answered. The patient or family acknowledged understanding and was agreeable with the care plan.       60 minutes of critical care time     MEDICATIONS GIVEN IN THE EMERGENCY:  Medications   iopamidol (ISOVUE-370) solution 75 mL (75 mLs Intravenous $Given 8/21/23 5824)   gadobutrol (GADAVIST) injection 10 mL (10 mLs Intravenous $Given 8/21/23 1085)       NEW PRESCRIPTIONS STARTED AT TODAY'S ER VISIT  Discharge Medication List as of 8/21/2023  8:42 PM            =================================================================    HPI    Patient information was obtained from: patient    Use of : N/A      Erwin Soriano is a 42 year old male with a pertinent history of GERD who presents to this ED via walk-in for evaluation of dizziness.     While driving,  "approximately 1 hour prior to ED visit, the patient felt a pulsating sensation in his head, bilateral arm weakness and his arms felt numb as if they were falling asleep. He mentions taking new vitamins this morning. He has no history of stroke, migraines, alcohol abuse, drug abuse, or diabetes. He has no associated nausea, headache, neck pain, chest pain, or any other complaints at this time.      REVIEW OF SYSTEMS   Review of Systems     PER HPI     PAST MEDICAL HISTORY:  No past medical history on file.    PAST SURGICAL HISTORY:  No past surgical history on file.        CURRENT MEDICATIONS:    No current facility-administered medications for this encounter.    Current Outpatient Medications:     ibuprofen (ADVIL/MOTRIN) 600 MG tablet, Take 1 tablet (600 mg) by mouth every 8 hours, Disp: 30 tablet, Rfl: 0    ALLERGIES:  Allergies   Allergen Reactions    Amoxicillin Rash     At childhood       FAMILY HISTORY:  Family History   Problem Relation Age of Onset    Chronic Obstructive Pulmonary Disease Father     No Known Problems Sister     No Known Problems Brother     Colon Cancer Paternal Grandmother     Cancer Paternal Grandfather        SOCIAL HISTORY:   Social History     Socioeconomic History    Marital status:    Tobacco Use    Smoking status: Former    Smokeless tobacco: Former     Types: Chew       VITALS:  /73   Pulse 72   Temp 97.1  F (36.2  C)   Resp 21   Ht 1.803 m (5' 11\")   Wt 104.3 kg (230 lb)   SpO2 96%   BMI 32.08 kg/m      PHYSICAL EXAM    Physical Exam  Vitals and nursing note reviewed.   Constitutional:       Appearance: Normal appearance.   Cardiovascular:      Rate and Rhythm: Normal rate.   Pulmonary:      Effort: Pulmonary effort is normal.   Abdominal:      Palpations: Abdomen is soft.   Musculoskeletal:      Cervical back: Normal range of motion.   Skin:     General: Skin is warm.   Neurological:      General: No focal deficit present.      Mental Status: He is alert and " oriented to person, place, and time.      Comments: 5/5 upper and lower extremity strength. CNII-XII intact.    Psychiatric:         Mood and Affect: Mood normal.            LAB:  All pertinent labs reviewed and interpreted.  Results for orders placed or performed during the hospital encounter of 08/21/23   CTA Head Neck with Contrast    Impression    IMPRESSION:   HEAD CT:  1.  No acute intracranial process.    HEAD CTA:   1.  No significant stenosis, aneurysm, or high flow vascular malformation identified.  2.  Variant [place] of [place] anatomy as above.    NECK CTA:  1.  Normal neck CTA.    2.  Dr. Burt discussed the above findings by telephone with Dr. Shaikh at 4:58 PM 08/21/2023.   MR Brain w/o & w Contrast    Impression    IMPRESSION:  1.  No acute infarct.  2.  Unremarkable MRI of the brain.     Basic metabolic panel   Result Value Ref Range    Sodium 137 136 - 145 mmol/L    Potassium 3.4 3.4 - 5.3 mmol/L    Chloride 102 98 - 107 mmol/L    Carbon Dioxide (CO2) 24 22 - 29 mmol/L    Anion Gap 11 7 - 15 mmol/L    Urea Nitrogen 15.1 6.0 - 20.0 mg/dL    Creatinine 1.11 0.67 - 1.17 mg/dL    Calcium 9.1 8.6 - 10.0 mg/dL    Glucose 158 (H) 70 - 99 mg/dL    GFR Estimate 85 >60 mL/min/1.73m2   Result Value Ref Range    INR 1.03 0.85 - 1.15   Partial thromboplastin time   Result Value Ref Range    aPTT 29 22 - 38 Seconds   Result Value Ref Range    Troponin T, High Sensitivity <6 <=22 ng/L   Alcohol level blood   Result Value Ref Range    Alcohol ethyl <0.01 <=0.01 g/dL   Glucose by meter   Result Value Ref Range    GLUCOSE BY METER POCT 92 70 - 99 mg/dL   Extra Red Top Tube   Result Value Ref Range    Hold Specimen JIC    CBC with platelets and differential   Result Value Ref Range    WBC Count 8.3 4.0 - 11.0 10e3/uL    RBC Count 5.15 4.40 - 5.90 10e6/uL    Hemoglobin 15.6 13.3 - 17.7 g/dL    Hematocrit 45.1 40.0 - 53.0 %    MCV 88 78 - 100 fL    MCH 30.3 26.5 - 33.0 pg    MCHC 34.6 31.5 - 36.5 g/dL    RDW 13.0 10.0 -  15.0 %    Platelet Count 184 150 - 450 10e3/uL    % Neutrophils 60 %    % Lymphocytes 31 %    % Monocytes 6 %    % Eosinophils 2 %    % Basophils 1 %    % Immature Granulocytes 0 %    NRBCs per 100 WBC 0 <1 /100    Absolute Neutrophils 5.1 1.6 - 8.3 10e3/uL    Absolute Lymphocytes 2.6 0.8 - 5.3 10e3/uL    Absolute Monocytes 0.5 0.0 - 1.3 10e3/uL    Absolute Eosinophils 0.2 0.0 - 0.7 10e3/uL    Absolute Basophils 0.0 0.0 - 0.2 10e3/uL    Absolute Immature Granulocytes 0.0 <=0.4 10e3/uL    Absolute NRBCs 0.0 10e3/uL   Drug abuse screen 77 urine (FL, RH, SH)   Result Value Ref Range    Amphetamines Urine Screen Negative Screen Negative    Barbituates Urine Screen Negative Screen Negative    Benzodiazepine Urine Screen Negative Screen Negative    Cannabinoids Urine Screen Negative Screen Negative    Opiates Urine Screen Negative Screen Negative    PCP Urine Screen Negative Screen Negative    Cocaine Urine Screen Negative Screen Negative       RADIOLOGY:  I independently interpreted the below imaging showing .   Please see official radiology report.  MR Brain w/o & w Contrast   Final Result   IMPRESSION:   1.  No acute infarct.   2.  Unremarkable MRI of the brain.         CTA Head Neck with Contrast   Final Result   IMPRESSION:    HEAD CT:   1.  No acute intracranial process.      HEAD CTA:    1.  No significant stenosis, aneurysm, or high flow vascular malformation identified.   2.  Variant [place] of [place] anatomy as above.      NECK CTA:   1.  Normal neck CTA.      2.  Dr. Burt discussed the above findings by telephone with Dr. Shaikh at 4:58 PM 08/21/2023.          EKG:    Performed at: 16:57    Impression: Sinus rhythm. Normal ECG.    Rate: 78 bpm  Rhythm: Sinus  Axis: 44  WV Interval: 200 ms  QRS Interval: 82 ms  QTc Interval: 456 ms  ST Changes: None  Comparison: Compared to ECG from 9/13/22, QT has lengthened.    I have independently reviewed and interpreted the EKG(s) documented above.        Pola CHRISTIANSON am  serving as a scribe to document services personally performed by Dr. Shaikh based on my observation and the provider's statements to me. I, Gregorio Shaikh MD attest that Pola Steward is acting in a scribe capacity, has observed my performance of the services and has documented them in accordance with my direction.    Gregorio Shaikh M.D.  Emergency Medicine  [facility] EMERGENCY DEPARTMENT  [address] [place] [id number] [phone]  Dept: [phone]     Gregorio Shaikh MD  08/21/23 5279

## 2023-08-21 NOTE — CONSULTS
"  [facility]    Stroke Telephone Note    I was called by Gregorio Shaikh on 08/21/23 regarding patient Erwin Soriano. The patient is a 42 year old male, former smoker, who transiently had a sudden onset throbbing frontal headache along with feeling off, as well as some imbalance as well as some transient tingling in both of his arms. On arrival to ED he just walks slow but nothing else focal. No disbaling symptoms. Per ED provider, he appears flushed/red. Patient says that the only thing that was different about today is that he took a detox supplement.    /75   Pulse 84   Temp 97.1  F (36.2  C)   Resp 23   Ht 1.803 m (5' 11\")   Wt 104.3 kg (230 lb)   SpO2 97%   BMI 32.08 kg/m       Stroke Code Data (for stroke code without tele)  Stroke code activated 08/21/23   1636   Stroke provider first response  08/21/23   1638            Last known normal 08/21/23   1530        Time of discovery   (or onset of symptoms)         Head CT read by Stroke Neuro Dr/Provider 08/21/23       Was stroke code de-escalated? Yes 08/21/23 1706          Imaging Findings  CT head: No ischemia/hemorrhage  CTA head/neck:  no obvious LVO or dissection. L P3 and P4 appears to be stenosed/beady (poor quality study so difficult to say w certainty), R fetal PCA, dolochoectasia with dominant R VA    Intravenous Thrombolysis  Not given due to:   - minor/isolated/quickly resolving symptoms    Endovascular Treatment  Not initiated due to absence of proximal vessel occlusion    Impression  Transient neurological symptoms following acute onset mild pulsating headache.  Mild imbalance - ongoing    D/D: Small stroke vs complex migraine     Recommendations   MRI Brain w/wo contrast    Case discussed with vascular neurology attending Dr. Kulkarni    My recommendations are based on the information provided over the phone by Erwin Soriano's in-person providers. They are not intended to replace the clinical judgment of his " "in-person providers. I was not requested to personally see or examine the patient at this time.      Nicci Pacheco MD  Vascular Neurology Fellow    To page me or covering stroke neurology team member, click here: [facility]  Choose \"On Call\" tab at top, then select \"NEUROLOGY/ALL SITES\" from middle drop-down box, press Enter, then look for \"stroke\" or \"telestroke\" for your site.   "

## 2023-08-22 NOTE — DISCHARGE INSTRUCTIONS
Unclear the sources of symptoms today. No findings on our MRIs, Cts, or lab work. There were no bleeds, masses, or evidence of stroke or inflammation. Okay to go back to work if you feel well but feel free to rest tomorrow and take it easy too. If this becomes a pattern, if may be related to a headache.

## 2023-09-10 LAB
ATRIAL RATE - MUSE: 78 BPM
DIASTOLIC BLOOD PRESSURE - MUSE: 73 MMHG
INTERPRETATION ECG - MUSE: NORMAL
P AXIS - MUSE: 64 DEGREES
PR INTERVAL - MUSE: 200 MS
QRS DURATION - MUSE: 82 MS
QT - MUSE: 400 MS
QTC - MUSE: 456 MS
R AXIS - MUSE: 44 DEGREES
SYSTOLIC BLOOD PRESSURE - MUSE: 139 MMHG
T AXIS - MUSE: 57 DEGREES
VENTRICULAR RATE- MUSE: 78 BPM

## 2024-06-24 NOTE — NURSING NOTE
Dariela Cardona, CMA     LAST APPOINTMENT: 05/30/2024  NEXT APPOINTMENT: 08/30/2024  LAST UDS: 02/26/2024  LAST CONTROLLED SUBSTANCE AGREEMENT: 02/26/2024

## 2024-11-14 ENCOUNTER — OFFICE VISIT (OUTPATIENT)
Dept: FAMILY MEDICINE | Facility: CLINIC | Age: 44
End: 2024-11-14
Payer: COMMERCIAL

## 2024-11-14 VITALS
WEIGHT: 229 LBS | BODY MASS INDEX: 32.06 KG/M2 | TEMPERATURE: 97.5 F | OXYGEN SATURATION: 99 % | DIASTOLIC BLOOD PRESSURE: 84 MMHG | HEIGHT: 71 IN | SYSTOLIC BLOOD PRESSURE: 127 MMHG | RESPIRATION RATE: 16 BRPM | HEART RATE: 55 BPM

## 2024-11-14 DIAGNOSIS — R42 LIGHTHEADEDNESS: Primary | ICD-10-CM

## 2024-11-14 LAB
ERYTHROCYTE [DISTWIDTH] IN BLOOD BY AUTOMATED COUNT: 13 % (ref 10–15)
HCT VFR BLD AUTO: 47.4 % (ref 40–53)
HGB BLD-MCNC: 15.8 G/DL (ref 13.3–17.7)
MCH RBC QN AUTO: 30.2 PG (ref 26.5–33)
MCHC RBC AUTO-ENTMCNC: 33.3 G/DL (ref 31.5–36.5)
MCV RBC AUTO: 91 FL (ref 78–100)
PLATELET # BLD AUTO: 178 10E3/UL (ref 150–450)
RBC # BLD AUTO: 5.24 10E6/UL (ref 4.4–5.9)
WBC # BLD AUTO: 6 10E3/UL (ref 4–11)

## 2024-11-14 PROCEDURE — 80053 COMPREHEN METABOLIC PANEL: CPT | Performed by: FAMILY MEDICINE

## 2024-11-14 PROCEDURE — 84443 ASSAY THYROID STIM HORMONE: CPT | Performed by: FAMILY MEDICINE

## 2024-11-14 PROCEDURE — 99214 OFFICE O/P EST MOD 30 MIN: CPT | Performed by: FAMILY MEDICINE

## 2024-11-14 PROCEDURE — 85027 COMPLETE CBC AUTOMATED: CPT | Performed by: FAMILY MEDICINE

## 2024-11-14 PROCEDURE — 36415 COLL VENOUS BLD VENIPUNCTURE: CPT | Performed by: FAMILY MEDICINE

## 2024-11-14 NOTE — PROGRESS NOTES
Assessment & Plan     1. Lightheadedness (Primary)  - CBC with platelets  - Comprehensive metabolic panel (BMP + Alb, Alk Phos, ALT, AST, Total. Bili, TP)  - TSH with free T4 reflex      Erwin presents today with vague, mild symptoms of lightheadedness without any other localizing symptoms to a particular organ system.     Will rule out thyroid disease, anemia, metabolic/electrolyte abnormality, diabetes.   Symptomatic in clinic, heart rate regular, therefore less suspect arrhythmia.   No other focal neurological symptoms, neuro exam normal in clinic, less suspect stroke.     Given facial pressure in maxillary and frontal sinus distribution bilaterally, would consider treating for sinusitis. Will trial antibiotic course if labs unrevealing.    Symptoms are not consistent with BPPV or orthostasis as not consistent with changes in position.     Patient has access to home blood pressure cuff, recommend he check at home over the next several days when symptomatic to rule out hypotension or hypertension.    Subjective   Erwin is a 44 year old, presenting for the following health issues:  Dizziness (Light headedness, front of the face/Started about a month ago. Feels it most the time he is awake)      11/14/2024    11:35 AM   Additional Questions   Roomed by Darren LANE MA     History of Present Illness       Reason for visit:  Light headed  Symptom onset:  More than a month      LIGHTHEADED: States he went to [place] 1.5 months ago, traveling a fair a bit the last 1.5 months. Feeling pressure in his face, throbbing, comes and goes. Feels lightheaded when he has these symptoms. Progress during the day and worsen.     Describes as very faint presyncopal symptoms.   Symptoms present right now, started a few hours after waking up, persistent.   Seems to get worse when he thinks about it.     Can't pinpoint any triggers or exacerbating factors.    Going to sleep may help.     Can start right away in the morning some times,  "then other times a few hours later.   Started after starting to work out more. Trying to drink more water, less with hydration packs.     Hasn't taken anything to help with symptoms.   Slight increase in pain when bending forwards.         Review of Systems  No fevers or chills.   No nasal congestion.   No ear pain or pressure.   No shortness of breath.   No chest pain or pressure.   No regular palpitations or skipped beats.   No bowel changes.   Denies any changes with stress, anxiety, or mood.   Denies any numbness, weakness, speech changes, vision changes, or headaches.   No increased thirst, increased urination with increased hydration.         Objective    /84   Pulse 55   Temp 97.5  F (36.4  C) (Oral)   Resp 16   Ht 1.8 m (5' 10.87\")   Wt 103.9 kg (229 lb)   SpO2 99%   BMI 32.06 kg/m    Body mass index is 32.06 kg/m .  Physical Exam    GENERAL: alert and no distress  NECK: no adenopathy, no thyromegaly or palpable nodules   RESP: lungs clear to auscultation - no rales, rhonchi or wheezes  CV: regular rate and rhythm, normal S1 S2, no S3 or S4, no murmur, click or rub, no peripheral edema  ABDOMEN: soft, nontender, no hepatosplenomegaly, no masses and bowel sounds normal  MS: no gross musculoskeletal defects noted, no edema  NEURO: EOMI, no horizontal or vertical nystagmus.  Gait normal.  No gross deficits present.  Face is symmetrical, speech is intact.            Signed Electronically by: Sharita Montoya,     "

## 2024-11-15 LAB
ALBUMIN SERPL BCG-MCNC: 4.3 G/DL (ref 3.5–5.2)
ALP SERPL-CCNC: 89 U/L (ref 40–150)
ALT SERPL W P-5'-P-CCNC: 23 U/L (ref 0–70)
ANION GAP SERPL CALCULATED.3IONS-SCNC: 9 MMOL/L (ref 7–15)
AST SERPL W P-5'-P-CCNC: 23 U/L (ref 0–45)
BILIRUB SERPL-MCNC: 0.5 MG/DL
BUN SERPL-MCNC: 13.2 MG/DL (ref 6–20)
CALCIUM SERPL-MCNC: 9.1 MG/DL (ref 8.8–10.4)
CHLORIDE SERPL-SCNC: 107 MMOL/L (ref 98–107)
CREAT SERPL-MCNC: 1.12 MG/DL (ref 0.67–1.17)
EGFRCR SERPLBLD CKD-EPI 2021: 83 ML/MIN/1.73M2
GLUCOSE SERPL-MCNC: 90 MG/DL (ref 70–99)
HCO3 SERPL-SCNC: 27 MMOL/L (ref 22–29)
POTASSIUM SERPL-SCNC: 4.5 MMOL/L (ref 3.4–5.3)
PROT SERPL-MCNC: 7.2 G/DL (ref 6.4–8.3)
SODIUM SERPL-SCNC: 143 MMOL/L (ref 135–145)
TSH SERPL DL<=0.005 MIU/L-ACNC: 1.19 UIU/ML (ref 0.3–4.2)

## 2024-11-15 NOTE — RESULT ENCOUNTER NOTE
Patient updated by [employer] message with lab results.      Cathi Hood,  Your labs have returned reassuring/normal.  Given duration of symptoms, we had discussed a trial of antibiotics to cover for possible bacterial sinusitis with pressure bilateral cheeks and forehead.  Let me know if you would like to proceed with a trial of antibiotics for symptom relief.  Sharita Montoya, DO

## 2024-12-07 ENCOUNTER — HEALTH MAINTENANCE LETTER (OUTPATIENT)
Age: 44
End: 2024-12-07

## 2024-12-17 ENCOUNTER — TRANSFERRED RECORDS (OUTPATIENT)
Dept: HEALTH INFORMATION MANAGEMENT | Facility: CLINIC | Age: 44
End: 2024-12-17
Payer: COMMERCIAL